# Patient Record
Sex: MALE | Race: WHITE | Employment: OTHER | URBAN - METROPOLITAN AREA
[De-identification: names, ages, dates, MRNs, and addresses within clinical notes are randomized per-mention and may not be internally consistent; named-entity substitution may affect disease eponyms.]

---

## 2017-09-06 ENCOUNTER — APPOINTMENT (OUTPATIENT)
Dept: GENERAL RADIOLOGY | Age: 62
DRG: 246 | End: 2017-09-06
Attending: INTERNAL MEDICINE
Payer: COMMERCIAL

## 2017-09-06 ENCOUNTER — HOSPITAL ENCOUNTER (INPATIENT)
Age: 62
LOS: 3 days | Discharge: HOME | DRG: 246 | End: 2017-09-09
Attending: EMERGENCY MEDICINE | Admitting: INTERNAL MEDICINE
Payer: COMMERCIAL

## 2017-09-06 DIAGNOSIS — I21.19 ACUTE MYOCARDIAL INFARCTION OF INFERIOR WALL, INITIAL EPISODE OF CARE (HCC): Primary | ICD-10-CM

## 2017-09-06 PROBLEM — I21.11 ST ELEVATION (STEMI) MYOCARDIAL INFARCTION INVOLVING RIGHT CORONARY ARTERY (HCC): Status: ACTIVE | Noted: 2017-09-06

## 2017-09-06 LAB
ACT BLD: 212 SECS (ref 79–138)
ALBUMIN SERPL-MCNC: 3.4 G/DL (ref 3.4–5)
ALBUMIN/GLOB SERPL: 1 {RATIO} (ref 0.8–1.7)
ALP SERPL-CCNC: 73 U/L (ref 45–117)
ALT SERPL-CCNC: 19 U/L (ref 16–61)
ANION GAP BLD CALC-SCNC: 18 MMOL/L (ref 10–20)
ANION GAP SERPL CALC-SCNC: 10 MMOL/L (ref 3–18)
ANION GAP SERPL CALC-SCNC: 9 MMOL/L (ref 3–18)
APTT PPP: 29.5 SEC (ref 23–36.4)
AST SERPL-CCNC: 31 U/L (ref 15–37)
ATRIAL RATE: 58 BPM
ATRIAL RATE: 62 BPM
BASOPHILS # BLD: 0.1 K/UL (ref 0–0.06)
BASOPHILS # BLD: 0.1 K/UL (ref 0–0.06)
BASOPHILS NFR BLD: 1 % (ref 0–2)
BASOPHILS NFR BLD: 1 % (ref 0–2)
BILIRUB SERPL-MCNC: 0.5 MG/DL (ref 0.2–1)
BNP SERPL-MCNC: 251 PG/ML (ref 0–900)
BUN BLD-MCNC: 9 MG/DL (ref 7–18)
BUN SERPL-MCNC: 10 MG/DL (ref 7–18)
BUN SERPL-MCNC: 11 MG/DL (ref 7–18)
BUN/CREAT SERPL: 12 (ref 12–20)
BUN/CREAT SERPL: 9 (ref 12–20)
CA-I BLD-MCNC: 1.14 MMOL/L (ref 1.12–1.32)
CALCIUM SERPL-MCNC: 8.7 MG/DL (ref 8.5–10.1)
CALCIUM SERPL-MCNC: 8.7 MG/DL (ref 8.5–10.1)
CALCULATED P AXIS, ECG09: 67 DEGREES
CALCULATED P AXIS, ECG09: 68 DEGREES
CALCULATED R AXIS, ECG10: 60 DEGREES
CALCULATED R AXIS, ECG10: 7 DEGREES
CALCULATED T AXIS, ECG11: 108 DEGREES
CALCULATED T AXIS, ECG11: 95 DEGREES
CHLORIDE BLD-SCNC: 101 MMOL/L (ref 100–108)
CHLORIDE SERPL-SCNC: 103 MMOL/L (ref 100–108)
CHLORIDE SERPL-SCNC: 103 MMOL/L (ref 100–108)
CK MB CFR SERPL CALC: 1.6 % (ref 0–4)
CK MB CFR SERPL CALC: 6.9 % (ref 0–4)
CK MB CFR SERPL CALC: 8.2 % (ref 0–4)
CK MB SERPL-MCNC: 13.8 NG/ML (ref 5–25)
CK MB SERPL-MCNC: 14.6 NG/ML (ref 5–25)
CK MB SERPL-MCNC: 2.2 NG/ML (ref 5–25)
CK SERPL-CCNC: 135 U/L (ref 39–308)
CK SERPL-CCNC: 179 U/L (ref 39–308)
CK SERPL-CCNC: 201 U/L (ref 39–308)
CO2 BLD-SCNC: 25 MMOL/L (ref 19–24)
CO2 SERPL-SCNC: 25 MMOL/L (ref 21–32)
CO2 SERPL-SCNC: 27 MMOL/L (ref 21–32)
CREAT SERPL-MCNC: 0.95 MG/DL (ref 0.6–1.3)
CREAT SERPL-MCNC: 1.08 MG/DL (ref 0.6–1.3)
CREAT UR-MCNC: 1 MG/DL (ref 0.6–1.3)
DIAGNOSIS, 93000: NORMAL
DIAGNOSIS, 93000: NORMAL
DIFFERENTIAL METHOD BLD: ABNORMAL
DIFFERENTIAL METHOD BLD: ABNORMAL
EOSINOPHIL # BLD: 0.4 K/UL (ref 0–0.4)
EOSINOPHIL # BLD: 0.6 K/UL (ref 0–0.4)
EOSINOPHIL NFR BLD: 5 % (ref 0–5)
EOSINOPHIL NFR BLD: 8 % (ref 0–5)
ERYTHROCYTE [DISTWIDTH] IN BLOOD BY AUTOMATED COUNT: 14.1 % (ref 11.6–14.5)
ERYTHROCYTE [DISTWIDTH] IN BLOOD BY AUTOMATED COUNT: 14.1 % (ref 11.6–14.5)
GLOBULIN SER CALC-MCNC: 3.3 G/DL (ref 2–4)
GLUCOSE BLD STRIP.AUTO-MCNC: 109 MG/DL (ref 74–106)
GLUCOSE SERPL-MCNC: 105 MG/DL (ref 74–99)
GLUCOSE SERPL-MCNC: 113 MG/DL (ref 74–99)
HCT VFR BLD AUTO: 41.9 % (ref 36–48)
HCT VFR BLD AUTO: 43.3 % (ref 36–48)
HCT VFR BLD CALC: 46 % (ref 36–49)
HGB BLD-MCNC: 14.2 G/DL (ref 13–16)
HGB BLD-MCNC: 15 G/DL (ref 13–16)
HGB BLD-MCNC: 15.6 G/DL (ref 12–16)
LYMPHOCYTES # BLD: 1.4 K/UL (ref 0.9–3.6)
LYMPHOCYTES # BLD: 2.3 K/UL (ref 0.9–3.6)
LYMPHOCYTES NFR BLD: 18 % (ref 21–52)
LYMPHOCYTES NFR BLD: 31 % (ref 21–52)
MAGNESIUM SERPL-MCNC: 1.7 MG/DL (ref 1.6–2.6)
MCH RBC QN AUTO: 33.6 PG (ref 24–34)
MCH RBC QN AUTO: 33.9 PG (ref 24–34)
MCHC RBC AUTO-ENTMCNC: 33.9 G/DL (ref 31–37)
MCHC RBC AUTO-ENTMCNC: 34.6 G/DL (ref 31–37)
MCV RBC AUTO: 98 FL (ref 74–97)
MCV RBC AUTO: 99.3 FL (ref 74–97)
MONOCYTES # BLD: 0.6 K/UL (ref 0.05–1.2)
MONOCYTES # BLD: 0.7 K/UL (ref 0.05–1.2)
MONOCYTES NFR BLD: 10 % (ref 3–10)
MONOCYTES NFR BLD: 8 % (ref 3–10)
NEUTS SEG # BLD: 3.7 K/UL (ref 1.8–8)
NEUTS SEG # BLD: 5.1 K/UL (ref 1.8–8)
NEUTS SEG NFR BLD: 50 % (ref 40–73)
NEUTS SEG NFR BLD: 68 % (ref 40–73)
P-R INTERVAL, ECG05: 190 MS
P-R INTERVAL, ECG05: 198 MS
PLATELET # BLD AUTO: 188 K/UL (ref 135–420)
PLATELET # BLD AUTO: 223 K/UL (ref 135–420)
PMV BLD AUTO: 10 FL (ref 9.2–11.8)
PMV BLD AUTO: 9.8 FL (ref 9.2–11.8)
POTASSIUM BLD-SCNC: 4.1 MMOL/L (ref 3.5–5.5)
POTASSIUM SERPL-SCNC: 3.7 MMOL/L (ref 3.5–5.5)
POTASSIUM SERPL-SCNC: 4.2 MMOL/L (ref 3.5–5.5)
PROT SERPL-MCNC: 6.7 G/DL (ref 6.4–8.2)
Q-T INTERVAL, ECG07: 446 MS
Q-T INTERVAL, ECG07: 470 MS
QRS DURATION, ECG06: 104 MS
QRS DURATION, ECG06: 142 MS
QTC CALCULATION (BEZET), ECG08: 437 MS
QTC CALCULATION (BEZET), ECG08: 477 MS
RBC # BLD AUTO: 4.22 M/UL (ref 4.7–5.5)
RBC # BLD AUTO: 4.42 M/UL (ref 4.7–5.5)
SODIUM BLD-SCNC: 139 MMOL/L (ref 136–145)
SODIUM SERPL-SCNC: 137 MMOL/L (ref 136–145)
SODIUM SERPL-SCNC: 140 MMOL/L (ref 136–145)
TROPONIN I BLD-MCNC: 0.1 NG/ML (ref 0–0.08)
TROPONIN I SERPL-MCNC: 0.29 NG/ML (ref 0–0.06)
TROPONIN I SERPL-MCNC: 3.87 NG/ML (ref 0–0.06)
TROPONIN I SERPL-MCNC: 4.49 NG/ML (ref 0–0.06)
VENTRICULAR RATE, ECG03: 58 BPM
VENTRICULAR RATE, ECG03: 62 BPM
WBC # BLD AUTO: 7.3 K/UL (ref 4.6–13.2)
WBC # BLD AUTO: 7.5 K/UL (ref 4.6–13.2)

## 2017-09-06 PROCEDURE — 77030013797 CARDIAC CATHETERIZATION

## 2017-09-06 PROCEDURE — 74011000250 HC RX REV CODE- 250: Performed by: INTERNAL MEDICINE

## 2017-09-06 PROCEDURE — 80053 COMPREHEN METABOLIC PANEL: CPT | Performed by: INTERNAL MEDICINE

## 2017-09-06 PROCEDURE — 74011250636 HC RX REV CODE- 250/636: Performed by: EMERGENCY MEDICINE

## 2017-09-06 PROCEDURE — 74011250636 HC RX REV CODE- 250/636: Performed by: INTERNAL MEDICINE

## 2017-09-06 PROCEDURE — B2111ZZ FLUOROSCOPY OF MULTIPLE CORONARY ARTERIES USING LOW OSMOLAR CONTRAST: ICD-10-PCS | Performed by: INTERNAL MEDICINE

## 2017-09-06 PROCEDURE — B2151ZZ FLUOROSCOPY OF LEFT HEART USING LOW OSMOLAR CONTRAST: ICD-10-PCS | Performed by: INTERNAL MEDICINE

## 2017-09-06 PROCEDURE — 83880 ASSAY OF NATRIURETIC PEPTIDE: CPT | Performed by: EMERGENCY MEDICINE

## 2017-09-06 PROCEDURE — 80048 BASIC METABOLIC PNL TOTAL CA: CPT | Performed by: EMERGENCY MEDICINE

## 2017-09-06 PROCEDURE — 74011250636 HC RX REV CODE- 250/636

## 2017-09-06 PROCEDURE — 71010 XR CHEST PORT: CPT

## 2017-09-06 PROCEDURE — 82550 ASSAY OF CK (CPK): CPT | Performed by: EMERGENCY MEDICINE

## 2017-09-06 PROCEDURE — 65610000006 HC RM INTENSIVE CARE

## 2017-09-06 PROCEDURE — 3E083PZ INTRODUCTION OF PLATELET INHIBITOR INTO HEART, PERCUTANEOUS APPROACH: ICD-10-PCS | Performed by: INTERNAL MEDICINE

## 2017-09-06 PROCEDURE — 93306 TTE W/DOPPLER COMPLETE: CPT

## 2017-09-06 PROCEDURE — 93005 ELECTROCARDIOGRAM TRACING: CPT

## 2017-09-06 PROCEDURE — 83735 ASSAY OF MAGNESIUM: CPT | Performed by: EMERGENCY MEDICINE

## 2017-09-06 PROCEDURE — 027034Z DILATION OF CORONARY ARTERY, ONE ARTERY WITH DRUG-ELUTING INTRALUMINAL DEVICE, PERCUTANEOUS APPROACH: ICD-10-PCS | Performed by: INTERNAL MEDICINE

## 2017-09-06 PROCEDURE — 74011000250 HC RX REV CODE- 250

## 2017-09-06 PROCEDURE — 74011250637 HC RX REV CODE- 250/637: Performed by: INTERNAL MEDICINE

## 2017-09-06 PROCEDURE — 77010033678 HC OXYGEN DAILY

## 2017-09-06 PROCEDURE — 96374 THER/PROPH/DIAG INJ IV PUSH: CPT

## 2017-09-06 PROCEDURE — 36415 COLL VENOUS BLD VENIPUNCTURE: CPT | Performed by: INTERNAL MEDICINE

## 2017-09-06 PROCEDURE — 85347 COAGULATION TIME ACTIVATED: CPT

## 2017-09-06 PROCEDURE — 80047 BASIC METABLC PNL IONIZED CA: CPT

## 2017-09-06 PROCEDURE — 83036 HEMOGLOBIN GLYCOSYLATED A1C: CPT | Performed by: HOSPITALIST

## 2017-09-06 PROCEDURE — 74011636320 HC RX REV CODE- 636/320: Performed by: INTERNAL MEDICINE

## 2017-09-06 PROCEDURE — 85025 COMPLETE CBC W/AUTO DIFF WBC: CPT | Performed by: EMERGENCY MEDICINE

## 2017-09-06 PROCEDURE — 4A023N7 MEASUREMENT OF CARDIAC SAMPLING AND PRESSURE, LEFT HEART, PERCUTANEOUS APPROACH: ICD-10-PCS | Performed by: INTERNAL MEDICINE

## 2017-09-06 PROCEDURE — 84484 ASSAY OF TROPONIN QUANT: CPT | Performed by: EMERGENCY MEDICINE

## 2017-09-06 PROCEDURE — 74011250637 HC RX REV CODE- 250/637: Performed by: EMERGENCY MEDICINE

## 2017-09-06 PROCEDURE — 99285 EMERGENCY DEPT VISIT HI MDM: CPT

## 2017-09-06 PROCEDURE — 85730 THROMBOPLASTIN TIME PARTIAL: CPT | Performed by: EMERGENCY MEDICINE

## 2017-09-06 RX ORDER — PANTOPRAZOLE SODIUM 20 MG/1
20 TABLET, DELAYED RELEASE ORAL 2 TIMES DAILY
COMMUNITY

## 2017-09-06 RX ORDER — ASPIRIN 81 MG/1
81 TABLET ORAL DAILY
Status: DISCONTINUED | OUTPATIENT
Start: 2017-09-06 | End: 2017-09-09 | Stop reason: HOSPADM

## 2017-09-06 RX ORDER — NITROGLYCERIN 0.4 MG/1
0.4 TABLET SUBLINGUAL AS NEEDED
Status: DISCONTINUED | OUTPATIENT
Start: 2017-09-06 | End: 2017-09-09 | Stop reason: HOSPADM

## 2017-09-06 RX ORDER — EPTIFIBATIDE 2 MG/ML
180 INJECTION, SOLUTION INTRAVENOUS
Status: DISCONTINUED | OUTPATIENT
Start: 2017-09-06 | End: 2017-09-06 | Stop reason: HOSPADM

## 2017-09-06 RX ORDER — MIDAZOLAM HYDROCHLORIDE 1 MG/ML
.5-2 INJECTION, SOLUTION INTRAMUSCULAR; INTRAVENOUS
Status: DISCONTINUED | OUTPATIENT
Start: 2017-09-06 | End: 2017-09-06 | Stop reason: HOSPADM

## 2017-09-06 RX ORDER — HEPARIN SODIUM 1000 [USP'U]/ML
50.1 INJECTION, SOLUTION INTRAVENOUS; SUBCUTANEOUS ONCE
Status: COMPLETED | OUTPATIENT
Start: 2017-09-06 | End: 2017-09-06

## 2017-09-06 RX ORDER — SODIUM CHLORIDE 9 MG/ML
50 INJECTION, SOLUTION INTRAVENOUS CONTINUOUS
Status: DISPENSED | OUTPATIENT
Start: 2017-09-06 | End: 2017-09-06

## 2017-09-06 RX ORDER — ENOXAPARIN SODIUM 100 MG/ML
40 INJECTION SUBCUTANEOUS EVERY 24 HOURS
Status: DISCONTINUED | OUTPATIENT
Start: 2017-09-07 | End: 2017-09-09 | Stop reason: HOSPADM

## 2017-09-06 RX ORDER — EZETIMIBE 10 MG/1
10 TABLET ORAL DAILY
COMMUNITY

## 2017-09-06 RX ORDER — HEPARIN SODIUM 1000 [USP'U]/ML
INJECTION, SOLUTION INTRAVENOUS; SUBCUTANEOUS
Status: COMPLETED
Start: 2017-09-06 | End: 2017-09-06

## 2017-09-06 RX ORDER — SODIUM CHLORIDE 9 MG/ML
1 INJECTION INTRAMUSCULAR; INTRAVENOUS; SUBCUTANEOUS ONCE
Status: DISCONTINUED | OUTPATIENT
Start: 2017-09-06 | End: 2017-09-06 | Stop reason: CLARIF

## 2017-09-06 RX ORDER — EPTIFIBATIDE 0.75 MG/ML
INJECTION, SOLUTION INTRAVENOUS
Status: COMPLETED
Start: 2017-09-06 | End: 2017-09-06

## 2017-09-06 RX ORDER — HEPARIN SODIUM 1000 [USP'U]/ML
1000-4000 INJECTION, SOLUTION INTRAVENOUS; SUBCUTANEOUS
Status: DISCONTINUED | OUTPATIENT
Start: 2017-09-06 | End: 2017-09-06 | Stop reason: HOSPADM

## 2017-09-06 RX ORDER — SODIUM CHLORIDE 0.9 % (FLUSH) 0.9 %
5-10 SYRINGE (ML) INJECTION AS NEEDED
Status: DISCONTINUED | OUTPATIENT
Start: 2017-09-06 | End: 2017-09-09 | Stop reason: HOSPADM

## 2017-09-06 RX ORDER — LIDOCAINE HYDROCHLORIDE 10 MG/ML
INJECTION INFILTRATION; PERINEURAL
Status: COMPLETED
Start: 2017-09-06 | End: 2017-09-06

## 2017-09-06 RX ORDER — SODIUM CHLORIDE 0.9 % (FLUSH) 0.9 %
5-10 SYRINGE (ML) INJECTION EVERY 8 HOURS
Status: DISCONTINUED | OUTPATIENT
Start: 2017-09-06 | End: 2017-09-09 | Stop reason: HOSPADM

## 2017-09-06 RX ORDER — LIDOCAINE HYDROCHLORIDE 10 MG/ML
3-30 INJECTION INFILTRATION; PERINEURAL
Status: DISCONTINUED | OUTPATIENT
Start: 2017-09-06 | End: 2017-09-06 | Stop reason: HOSPADM

## 2017-09-06 RX ORDER — HEPARIN SODIUM 200 [USP'U]/100ML
500 INJECTION, SOLUTION INTRAVENOUS
Status: DISCONTINUED | OUTPATIENT
Start: 2017-09-06 | End: 2017-09-06 | Stop reason: HOSPADM

## 2017-09-06 RX ORDER — EPTIFIBATIDE 2 MG/ML
INJECTION, SOLUTION INTRAVENOUS
Status: COMPLETED
Start: 2017-09-06 | End: 2017-09-06

## 2017-09-06 RX ORDER — EPTIFIBATIDE 0.75 MG/ML
2 INJECTION, SOLUTION INTRAVENOUS CONTINUOUS
Status: DISPENSED | OUTPATIENT
Start: 2017-09-06 | End: 2017-09-06

## 2017-09-06 RX ORDER — METOPROLOL TARTRATE 25 MG/1
12.5 TABLET, FILM COATED ORAL EVERY 12 HOURS
Status: DISCONTINUED | OUTPATIENT
Start: 2017-09-06 | End: 2017-09-09 | Stop reason: HOSPADM

## 2017-09-06 RX ORDER — MIDAZOLAM HYDROCHLORIDE 1 MG/ML
INJECTION, SOLUTION INTRAMUSCULAR; INTRAVENOUS
Status: COMPLETED
Start: 2017-09-06 | End: 2017-09-06

## 2017-09-06 RX ORDER — HEPARIN SODIUM 200 [USP'U]/100ML
INJECTION, SOLUTION INTRAVENOUS
Status: COMPLETED
Start: 2017-09-06 | End: 2017-09-06

## 2017-09-06 RX ORDER — FENTANYL CITRATE 50 UG/ML
INJECTION, SOLUTION INTRAMUSCULAR; INTRAVENOUS
Status: COMPLETED
Start: 2017-09-06 | End: 2017-09-06

## 2017-09-06 RX ORDER — NITROGLYCERIN 0.4 MG/1
0.4 TABLET SUBLINGUAL
COMMUNITY

## 2017-09-06 RX ORDER — SODIUM CHLORIDE 9 MG/ML
25 INJECTION, SOLUTION INTRAVENOUS CONTINUOUS
Status: DISCONTINUED | OUTPATIENT
Start: 2017-09-06 | End: 2017-09-06

## 2017-09-06 RX ORDER — FENTANYL CITRATE 50 UG/ML
25-100 INJECTION, SOLUTION INTRAMUSCULAR; INTRAVENOUS
Status: DISCONTINUED | OUTPATIENT
Start: 2017-09-06 | End: 2017-09-06 | Stop reason: HOSPADM

## 2017-09-06 RX ORDER — VERAPAMIL HYDROCHLORIDE 2.5 MG/ML
INJECTION, SOLUTION INTRAVENOUS
Status: DISPENSED
Start: 2017-09-06 | End: 2017-09-06

## 2017-09-06 RX ORDER — HEPARIN SODIUM 10000 [USP'U]/100ML
12-25 INJECTION, SOLUTION INTRAVENOUS
Status: DISCONTINUED | OUTPATIENT
Start: 2017-09-06 | End: 2017-09-06

## 2017-09-06 RX ADMIN — EPTIFIBATIDE 2 MCG/KG/MIN: 0.75 INJECTION, SOLUTION INTRAVENOUS at 08:17

## 2017-09-06 RX ADMIN — TICAGRELOR 90 MG: 90 TABLET ORAL at 22:00

## 2017-09-06 RX ADMIN — HEPARIN SODIUM 2000 UNITS: 1000 INJECTION, SOLUTION INTRAVENOUS; SUBCUTANEOUS at 01:25

## 2017-09-06 RX ADMIN — LIDOCAINE HYDROCHLORIDE 3 ML: 10 INJECTION, SOLUTION INFILTRATION; PERINEURAL at 01:14

## 2017-09-06 RX ADMIN — FENTANYL CITRATE 25 MCG: 50 INJECTION, SOLUTION INTRAMUSCULAR; INTRAVENOUS at 01:50

## 2017-09-06 RX ADMIN — HEPARIN SODIUM AND DEXTROSE 12 UNITS/KG/HR: 10000; 5 INJECTION INTRAVENOUS at 00:56

## 2017-09-06 RX ADMIN — SODIUM CHLORIDE 25 ML/HR: 900 INJECTION, SOLUTION INTRAVENOUS at 02:02

## 2017-09-06 RX ADMIN — EPTIFIBATIDE 14.36 MG: 2 INJECTION, SOLUTION INTRAVENOUS at 01:42

## 2017-09-06 RX ADMIN — TICAGRELOR 90 MG: 90 TABLET ORAL at 09:24

## 2017-09-06 RX ADMIN — NITROGLYCERIN 0.4 MG: 0.4 TABLET SUBLINGUAL at 00:52

## 2017-09-06 RX ADMIN — MIDAZOLAM HYDROCHLORIDE 1 MG: 1 INJECTION, SOLUTION INTRAMUSCULAR; INTRAVENOUS at 01:20

## 2017-09-06 RX ADMIN — HEPARIN SODIUM 4000 UNITS: 1000 INJECTION, SOLUTION INTRAVENOUS; SUBCUTANEOUS at 00:52

## 2017-09-06 RX ADMIN — VERAPAMIL HYDROCHLORIDE 3 ML: 2.5 INJECTION INTRAVENOUS at 01:17

## 2017-09-06 RX ADMIN — SODIUM CHLORIDE 50 ML/HR: 900 INJECTION, SOLUTION INTRAVENOUS at 03:13

## 2017-09-06 RX ADMIN — ASPIRIN 81 MG: 81 TABLET, COATED ORAL at 08:11

## 2017-09-06 RX ADMIN — HEPARIN SODIUM 4000 UNITS: 1000 INJECTION, SOLUTION INTRAVENOUS; SUBCUTANEOUS at 01:17

## 2017-09-06 RX ADMIN — METOPROLOL TARTRATE 12.5 MG: 25 TABLET ORAL at 09:23

## 2017-09-06 RX ADMIN — LIDOCAINE HYDROCHLORIDE 3 ML: 10 INJECTION INFILTRATION; PERINEURAL at 01:14

## 2017-09-06 RX ADMIN — SODIUM CHLORIDE 500 ML: 900 INJECTION, SOLUTION INTRAVENOUS at 00:53

## 2017-09-06 RX ADMIN — MIDAZOLAM HYDROCHLORIDE 0.5 MG: 1 INJECTION, SOLUTION INTRAMUSCULAR; INTRAVENOUS at 01:50

## 2017-09-06 RX ADMIN — MIDAZOLAM HYDROCHLORIDE 0.5 MG: 1 INJECTION, SOLUTION INTRAMUSCULAR; INTRAVENOUS at 02:00

## 2017-09-06 RX ADMIN — EPTIFIBATIDE 2 MCG/KG/MIN: 0.75 INJECTION, SOLUTION INTRAVENOUS at 01:45

## 2017-09-06 RX ADMIN — FENTANYL CITRATE 50 MCG: 50 INJECTION, SOLUTION INTRAMUSCULAR; INTRAVENOUS at 01:20

## 2017-09-06 RX ADMIN — HEPARIN SODIUM 2000 UNITS: 200 INJECTION, SOLUTION INTRAVENOUS at 01:31

## 2017-09-06 RX ADMIN — FENTANYL CITRATE 25 MCG: 50 INJECTION, SOLUTION INTRAMUSCULAR; INTRAVENOUS at 02:00

## 2017-09-06 RX ADMIN — TICAGRELOR 180 MG: 90 TABLET ORAL at 02:07

## 2017-09-06 RX ADMIN — METOPROLOL TARTRATE 12.5 MG: 25 TABLET ORAL at 22:00

## 2017-09-06 RX ADMIN — Medication 10 ML: at 13:08

## 2017-09-06 RX ADMIN — NITROGLYCERIN 1 INCH: 20 OINTMENT TOPICAL at 00:57

## 2017-09-06 RX ADMIN — EPTIFIBATIDE 14.36 MG: 2 INJECTION, SOLUTION INTRAVENOUS at 01:53

## 2017-09-06 RX ADMIN — IOPAMIDOL 190 ML: 510 INJECTION, SOLUTION INTRAVASCULAR at 02:03

## 2017-09-06 NOTE — PROCEDURES
LHC, LV gram and coronary angiogram done via left radial artery. Distal % instent thrombosis. LM luminal irregularities. LAD has mild to moderate disease. LCx has mild disease. Successful PTCA/stent of distal RCA done with good result. LV gram sub optimal, LVEF appears preserved. Aspirin, Brilinta, Beat blocker, Statin and Integrilin drip  Echocardiogram    Plan discussed with patient and family.

## 2017-09-06 NOTE — PROGRESS NOTES
Pt SR BBB on monitor, no complaints of chest pain this shift. Integrilin off at 1300, Sheath site CDI, no hematoma/oozing. Compression dressing removed at 1600. Troponin trending down. On RA tolerating well. A&Ox4, DICKENS. Cardiac diet, eating breakfast and lunch, OOB to bedside commode. Adequate UOP. Family by bedside, updated on POC.

## 2017-09-06 NOTE — ED TRIAGE NOTES
Patient arrives via EMS for chest pain that started at 1800, patient took 2 of his own Nitroglycerin sublingual tablets and went to sleep. Patient took 1 more additional nitroglycerin prior to EMS arrival. Patient given 324 aspirin, 4 zofran, 4 morphine. Patient states no relief from pain. Sepsis Screening completed    (  )Patient meets SIRS criteria. ( x )Patient does not meet SIRS criteria.       SIRS Criteria is achieved when two or more of the following are present   Temperature < 96.8°F (36°C) or > 100.9°F (38.3°C)   Heart Rate > 90 beats per minute   Respiratory Rate > 20 breaths per minute   WBC count > 12,000 or <4,000 or > 10% bands

## 2017-09-06 NOTE — ED PROVIDER NOTES
HPI Comments: 12:39 AM    Amada Foley is a 60 y.o. male PMHx MI with of cardiac stents in January 2015 presents to ED via EMS C/O CP, described as a tightness, x ~1800 yesterday. Pt took two nitro at home and reported that the pain did not go away, but lessened it. Woke up and took a third nitro which did not help at all. EMS gave the pt baby Asprin, 4 Zofran, and 4 mg morphine. Per EMS, pt's blood pressure was 161/93 en route to the ED. Their EKG read STEMI. Pt endorses smoking despite his previous MI, no alcohol or drugs. Family history is negative . Reports shellfish allergy. Pt denies any other sxs or complaints. The history is provided by the patient and the EMS personnel. No  was used. Past Medical History:   Diagnosis Date    MI (myocardial infarction) ([facility] [address]        Past Surgical History:   Procedure Laterality Date    HX CORONARY STENT PLACEMENT  2015         History reviewed. No pertinent family history. Social History     Social History    Marital status:      Spouse name: N/A    Number of children: N/A    Years of education: N/A     Occupational History    Not on file. Social History Main Topics    Smoking status: Current Every Day Smoker     Packs/day: 1.50    Smokeless tobacco: Not on file    Alcohol use 12.6 oz/week     21 Cans of beer per week    Drug use: No    Sexual activity: Not on file     Other Topics Concern    Not on file     Social History Narrative    No narrative on file         ALLERGIES: Other plant, animal, environmental and Shellfish derived    Review of Systems   Constitutional: Negative for chills and fever. Cardiovascular: Positive for chest pain. Neurological: Negative for headaches. All other systems reviewed and are negative.       Vitals:    09/06/17 0052 09/06/17 0055 09/06/17 0057 09/06/17 0100   BP: 129/79 123/69 123/69 121/74   Pulse: 67 60 63 75   Resp:  17  16   Temp:       SpO2:  98%  95%   Weight: Height:                Physical Exam   Constitutional: He is oriented to person, place, and time. He appears well-developed and well-nourished. Uncomfortable appearing, nontoxic   HENT:   Head: Normocephalic and atraumatic. Right Ear: External ear normal.   Left Ear: External ear normal.   Mouth/Throat: Oropharynx is clear and moist. No oropharyngeal exudate. Eyes: Conjunctivae and EOM are normal. Pupils are equal, round, and reactive to light. No scleral icterus. No pallor   Neck: Normal range of motion. Neck supple. No JVD present. No tracheal deviation present. No thyromegaly present. Cardiovascular: Normal rate, regular rhythm and normal heart sounds. Pulmonary/Chest: Effort normal and breath sounds normal. No stridor. No respiratory distress. Abdominal: Soft. Bowel sounds are normal. He exhibits no distension. There is no tenderness. There is no rebound and no guarding. Musculoskeletal: Normal range of motion. He exhibits no edema or tenderness. No soft tissue injuries   Lymphadenopathy:     He has no cervical adenopathy. Neurological: He is alert and oriented to person, place, and time. He has normal reflexes. No cranial nerve deficit. Coordination normal.   Skin: Skin is warm and dry. No rash noted. He is not diaphoretic. No erythema. Various scattered tattoos    Psychiatric: He has a normal mood and affect. His behavior is normal. Judgment and thought content normal.   Nursing note and vitals reviewed. RESULTS:    PULSE OXIMETRY NOTE:  Pulse-ox is 95% on room air  Interpretation: normal     EKG interpretation: EMS  12:51 AM   Code STEMI, called at 12:30 AM. EKG meets ST evelation MI criteria, Inferior ST elevation considered acute infarct. EKG read by Brooklyn Bruner. Pranay Pereira MD at 12:30 AM    EKG interpretation: (Preliminary)  12:52 AM   Acute left wall MI with reciprocal changes, Sinus bradycardia at 58 bpm  EKG read by Brooklyn Bruner.  Pranay Pereira MD at 12:38 AM      Labs Reviewed   CBC WITH AUTOMATED DIFF - Abnormal; Notable for the following:        Result Value    RBC 4.42 (*)     MCV 98.0 (*)     EOSINOPHILS 8 (*)     ABS. EOSINOPHILS 0.6 (*)     ABS. BASOPHILS 0.1 (*)     All other components within normal limits   CARDIAC PANEL,(CK, CKMB & TROPONIN) - Abnormal; Notable for the following:     Troponin-I, Qt. 0.29 (*)     All other components within normal limits   METABOLIC PANEL, BASIC - Abnormal; Notable for the following:     Glucose 113 (*)     BUN/Creatinine ratio 9 (*)     All other components within normal limits   POC CHEM8 - Abnormal; Notable for the following:     CO2, POC 25 (*)     Glucose,  (*)     All other components within normal limits   POC TROPONIN-I - Abnormal; Notable for the following:     Troponin-I (POC) 0.10 (*)     All other components within normal limits   PTT   CBC WITH AUTOMATED DIFF   MAGNESIUM   NT-PRO BNP   PTT   CBC WITH AUTOMATED DIFF   POC TROPONIN-I   POC CHEM8       Recent Results (from the past 12 hour(s))   CBC WITH AUTOMATED DIFF    Collection Time: 09/06/17 12:39 AM   Result Value Ref Range    WBC 7.3 4.6 - 13.2 K/uL    RBC 4.42 (L) 4.70 - 5.50 M/uL    HGB 15.0 13.0 - 16.0 g/dL    HCT 43.3 36.0 - 48.0 %    MCV 98.0 (H) 74.0 - 97.0 FL    MCH 33.9 24.0 - 34.0 PG    MCHC 34.6 31.0 - 37.0 g/dL    RDW 14.1 11.6 - 14.5 %    PLATELET 170 419 - 378 K/uL    MPV 9.8 9.2 - 11.8 FL    NEUTROPHILS 50 40 - 73 %    LYMPHOCYTES 31 21 - 52 %    MONOCYTES 10 3 - 10 %    EOSINOPHILS 8 (H) 0 - 5 %    BASOPHILS 1 0 - 2 %    ABS. NEUTROPHILS 3.7 1.8 - 8.0 K/UL    ABS. LYMPHOCYTES 2.3 0.9 - 3.6 K/UL    ABS. MONOCYTES 0.7 0.05 - 1.2 K/UL    ABS. EOSINOPHILS 0.6 (H) 0.0 - 0.4 K/UL    ABS.  BASOPHILS 0.1 (H) 0.0 - 0.06 K/UL    DF AUTOMATED     PTT    Collection Time: 09/06/17 12:39 AM   Result Value Ref Range    aPTT 29.5 23.0 - 36.4 SEC   CARDIAC PANEL,(CK, CKMB & TROPONIN)    Collection Time: 09/06/17 12:39 AM   Result Value Ref Range     39 - 308 U/L    CK - MB 2. 2 <3.6 ng/ml    CK-MB Index 1.6 0.0 - 4.0 %    Troponin-I, Qt. 0.29 (H) 0.00 - 2.86 NG/ML   METABOLIC PANEL, BASIC    Collection Time: 09/06/17 12:39 AM   Result Value Ref Range    Sodium 140 136 - 145 mmol/L    Potassium 4.2 3.5 - 5.5 mmol/L    Chloride 103 100 - 108 mmol/L    CO2 27 21 - 32 mmol/L    Anion gap 10 3.0 - 18 mmol/L    Glucose 113 (H) 74 - 99 mg/dL    BUN 10 7.0 - 18 MG/DL    Creatinine 1.08 0.6 - 1.3 MG/DL    BUN/Creatinine ratio 9 (L) 12 - 20      GFR est AA >60 >60 ml/min/1.73m2    GFR est non-AA >60 >60 ml/min/1.73m2    Calcium 8.7 8.5 - 10.1 MG/DL   MAGNESIUM    Collection Time: 09/06/17 12:39 AM   Result Value Ref Range    Magnesium 1.7 1.6 - 2.6 mg/dL   NT-PRO BNP    Collection Time: 09/06/17 12:39 AM   Result Value Ref Range    NT pro- 0 - 900 PG/ML   POC CHEM8    Collection Time: 09/06/17 12:49 AM   Result Value Ref Range    CO2, POC 25 (H) 19 - 24 MMOL/L    Glucose,  (H) 74 - 106 MG/DL    BUN, POC 9 7 - 18 MG/DL    Creatinine, POC 1.0 0.6 - 1.3 MG/DL    GFRAA, POC >60 >60 ml/min/1.73m2    GFRNA, POC >60 >60 ml/min/1.73m2    Sodium,  136 - 145 MMOL/L    Potassium, POC 4.1 3.5 - 5.5 MMOL/L    Calcium, ionized (POC) 1.14 1.12 - 1.32 MMOL/L    Chloride,  100 - 108 MMOL/L    Anion gap, POC 18 10 - 20      Hematocrit, POC 46 36 - 49 %    Hemoglobin, POC 15.6 12 - 16 G/DL   POC TROPONIN-I    Collection Time: 09/06/17 12:51 AM   Result Value Ref Range    Troponin-I (POC) 0.10 (H) 0.00 - 0.08 ng/mL       MDM  Number of Diagnoses or Management Options  Acute myocardial infarction of inferior wall, initial episode of care [facility]):   Diagnosis management comments: DDx: acute MI, ventricular aneurysm. PE is also possible.        Amount and/or Complexity of Data Reviewed  Clinical lab tests: ordered and reviewed  Tests in the medicine section of CPT®: ordered and reviewed (EKG)  Obtain history from someone other than the patient: yes (EMS)  Discuss the patient with other providers: yes (Cardiology - Kentrell Hatfield MD  )  Independent visualization of images, tracings, or specimens: yes (EKG)      ED Course     Medications   sodium chloride 0.9 % bolus infusion 500 mL (500 mL IntraVENous New Bag 9/6/17 0053)   nitroglycerin (NITROSTAT) tablet 0.4 mg (0.4 mg SubLINGual Given 9/6/17 0052)   heparin 25,000 units in D5W 250 ml infusion (0 Units/kg/hr × 79.8 kg IntraVENous Stopped 9/6/17 0119)   0.9% sodium chloride infusion (not administered)   eptifibatide (INTEGRILIN) injection 14.36 mg (not administered)   eptifibatide (INTEGRILIN) 0.75 mg/mL infusion (not administered)   fentaNYL citrate (PF) injection  mcg (50 mcg IntraVENous Given 9/6/17 0120)   heparin (porcine) 1,000 unit/mL injection 1,000-4,000 Units (2,000 Units IntraVENous Given 9/6/17 0125)   heparinized saline 2 units/mL infusion 1,000 Units (2,000 Units IntraarTERial New Bag 9/6/17 0131)   iopamidol (ISOVUE 250) 51 % contrast injection 101-150 mL (not administered)   lidocaine (XYLOCAINE) 10 mg/mL (1 %) injection 3-30 mL (3 mL IntraDERMal Given by Provider 9/6/17 0114)   midazolam (VERSED) injection 0.5-2 mg (1 mg IntraVENous Given 9/6/17 0120)   nitroglycerin 100 mcg/ml compounded injection (not administered)   nitroglycerin 1 mg/10mL 0.2 mg, verapamil 2.5 mg injection (3 mL IntraarTERial Given by Provider 9/6/17 0117)   verapamil (ISOPTIN) 2.5 mg/mL injection (not administered)   heparin (porcine) 1,000 unit/mL injection 4,000 Units (4,000 Units IntraVENous Given 9/6/17 0052)   nitroglycerin (NITROBID) 2 % ointment 1 Inch (1 Inch Topical Given 9/6/17 0057)       Procedures      PROGRESS NOTE:  12:39 AM  Initial assessment performed. Written by Bandar Lopes ED Scribe, as dictated by Quinton Meyer MD    CONSULT NOTE:   12:39 AM  Susy Pro. Daxa Meyer MD spoke with Kentrell Hatfield MD   Specialty: Cardiology   Discussed pt's hx, disposition, and available diagnostic and imaging results over the telephone. Reviewed care plans. Consulting physician agrees with plan to admit to ICU after cath lab. CLINICAL IMPRESSION:    1. Acute myocardial infarction of inferior wall, initial episode of care [facility])        1:13 AM  Patient is being admitted to the hospital by Sena Lee MD. The results of their tests and reasons for their admission have been discussed with them and/or available family. They convey agreement and understanding for the need to be admitted and for their admission diagnosis. PLAN: ADMIT TO ICU AFTER CATH LAB      ATTESTATIONS:  This note is prepared by Ector Tena and Qian Swan, acting as Scribes for [employer]. Liu Aguirre MD.    [employer]. Liu Aguirre MD: The scribe's documentation has been prepared under my direction and personally reviewed by me in its entirety. I confirm that the note above accurately reflects all work, treatment, procedures, and medical decision making performed by me.

## 2017-09-06 NOTE — H&P
Date of Surgery Update:  Lukas Hinojosa was seen and examined. History and physical has been reviewed. The patient has been examined.  There have been no significant clinical changes since the completion of the originally dated History and Physical.    Signed By: Kyra Graf MD     September 6, 2017 1:08 AM

## 2017-09-06 NOTE — IP AVS SNAPSHOT
Radha [address] [address] 
[phone] Patient: Gladis oJsé MRN: [id number] :1955 Current Discharge Medication List  
  
START taking these medications Dose & Instructions Dispensing Information Comments Morning Noon Evening Bedtime  
 aspirin delayed-release 81 mg tablet Your last dose was: Your next dose is:    
   
   
 Dose:  81 mg Take 1 Tab by mouth daily. Quantity:  30 Tab Refills:  0  
     
   
   
   
  
 losartan 25 mg tablet Commonly known as:  COZAAR Your last dose was: Your next dose is:    
   
   
 Dose:  25 mg Take 1 Tab by mouth daily. Quantity:  30 Tab Refills:  1  
     
   
   
   
  
 metoprolol tartrate 25 mg tablet Commonly known as:  LOPRESSOR Your last dose was: Your next dose is:    
   
   
 Dose:  12.5 mg Take 0.5 Tabs by mouth every twelve (12) hours. Quantity:  30 Tab Refills:  1  
     
   
   
   
  
 ticagrelor 90 mg tablet Commonly known as:  [employer] Your last dose was: Your next dose is:    
   
   
 Dose:  90 mg Take 1 Tab by mouth two (2) times a day. Quantity:  60 Tab Refills:  1 CONTINUE these medications which have CHANGED Dose & Instructions Dispensing Information Comments Morning Noon Evening Bedtime * nitroglycerin 0.4 mg SL tablet Commonly known as:  NITROSTAT What changed:  Another medication with the same name was added. Make sure you understand how and when to take each. Your last dose was: Your next dose is:    
   
   
 Dose:  0.4 mg  
0.4 mg by SubLINGual route every five (5) minutes as needed for Chest Pain. Refills:  0  
     
   
   
   
  
 * nitroglycerin 0.4 mg SL tablet Commonly known as:  NITROSTAT What changed:   You were already taking a medication with the same name, and this prescription was added. Make sure you understand how and when to take each. Your last dose was: Your next dose is:    
   
   
 Dose:  0.4 mg  
1 Tab by SubLINGual route as needed for Chest Pain. Quantity:  1 Bottle Refills:  0  
     
   
   
   
  
 * Notice: This list has 2 medication(s) that are the same as other medications prescribed for you. Read the directions carefully, and ask your doctor or other care provider to review them with you. CONTINUE these medications which have NOT CHANGED Dose & Instructions Dispensing Information Comments Morning Noon Evening Bedtime  
 ezetimibe 10 mg tablet Commonly known as:  Meg Huffman Your last dose was: Your next dose is:    
   
   
 Dose:  10 mg Take 10 mg by mouth daily. Refills:  0 PROTONIX 20 mg tablet Generic drug:  pantoprazole Your last dose was: Your next dose is:    
   
   
 Dose:  20 mg Take 20 mg by mouth two (2) times a day. Refills:  0 Where to Get Your Medications Information on where to get these meds will be given to you by the nurse or doctor. ! Ask your nurse or doctor about these medications  
  aspirin delayed-release 81 mg tablet  
 losartan 25 mg tablet  
 metoprolol tartrate 25 mg tablet  
 nitroglycerin 0.4 mg SL tablet  
 ticagrelor 90 mg tablet

## 2017-09-06 NOTE — PROGRESS NOTES
Readmission Risk Assessment: Low Risk and MSSP/[facility] Help [facility] patients    RRAT Score: 1 - 12    Initial Assessment:Emergency Contact: chart reviewed and spoke with patient at bedside,pt came to ED with c/o chest pains, lives at home with wife,denies using any home DME's ,no cm needs expected at discharge cm will cont to review and remain available if needed. See chart  Pertinent Medical Hx:   PCP/Specialists: Community Services:  in [place]    DME:     Low Risk Care Transition Plan:  1. Evaluate for [facility] or [address] coordination of resources  2. Involve patient/caregiver in assessment, planning, education and implement of intervention. 3. CM daily patient care huddles/interdisciplinary rounds. 4. PCP/Specialist appointment within 7 - 10 days made prior to discharge. 5. Facilitate transportation and logistics for follow-up appointments. 6. Handoff to [address] Nurse Navigator or PCP practice.

## 2017-09-06 NOTE — ACP (ADVANCE CARE PLANNING)
assisted patient in completing Advance Medical Directives. A copy was placed in the chart for scanning and extra copies were left for the patient.  completed visit with patient and offered Pastoral care. Chaplains will continue to follow and will provide pastoral care  as needed or requested. Also assisted wife in completing one. [id number] [facility]   Board Certified   [phone] - Office

## 2017-09-06 NOTE — PROGRESS NOTES
Cardiac Cath Lab:  Pre Procedure Chart Check     Patients chart was accessed and reviewed for possible and/or scheduled procedure. Creatinine Clearance:  CREATININE: 1.08 MG/DL (09/06/17 0039)  Estimated creatinine clearance: 76.8 mL/min    Total Contrast  Load:  3 x estimated clearance amount=  228   ml    75% of Contrast Load:  0.75 x Total Contrast Load=     171   ml    Recent Labs      09/06/17   0039   WBC  7.3   RBC  4.42*   HCT  43.3   HGB  15.0   PLT  223   APTT  29.5   NA  140   K  4.2   BUN  10   CREA  1.08   GFRAA  >60   GFRNA  >60   CA  8.7       BMI: Body mass index is 23.87 kg/(m^2). ALLERGIES:   Allergies   Allergen Reactions    Other Plant, Animal, Environmental Anaphylaxis     Tad    Shellfish Derived Anaphylaxis       Lines:        Peripheral IV 09/06/17 Left Antecubital (Active)   Site Assessment Clean, dry, & intact 9/6/2017 12:50 AM   Phlebitis Assessment 0 9/6/2017 12:50 AM   Dressing Status Clean, dry, & intact 9/6/2017 12:50 AM   Dressing Type Transparent 9/6/2017 12:50 AM       Peripheral IV 09/06/17 Right Forearm (Active)   Site Assessment Clean, dry, & intact 9/6/2017 12:39 AM   Phlebitis Assessment 0 9/6/2017 12:39 AM   Infiltration Assessment 0 9/6/2017 12:39 AM   Dressing Status Clean, dry, & intact 9/6/2017 12:39 AM   Hub Color/Line Status Patent 9/6/2017 12:39 AM          History:    Past Medical History:   Diagnosis Date    MI (myocardial infarction) ([facility] [address]      Past Surgical History:   Procedure Laterality Date    HX CORONARY STENT PLACEMENT  2015     There is no problem list on file for this patient.

## 2017-09-06 NOTE — PROGRESS NOTES
1662- Received from cardiac cath lab, assessment completed per flow sheet. Alert, pleasant, cooperative. Reports mid-sternal discomfort 1/10, much improved from arrival to ED, Landon from cath lab reports that  aware of current chest discomfort. Left wrist puncture site D/I, no bleeding, no oozing, TR band in place and inflated, cath  states he will be back around 4 to remove. Integrilin infusing via IV pump without difficulty. 0500- Cath  in, TR band removed, armboard left in place. 0505- Left wrist dressing D/I.

## 2017-09-06 NOTE — ED NOTES
Double verified the order to start heparin drip as ordered. He verbally acknowledges and has spoken to Dr Zenia Hutton.

## 2017-09-06 NOTE — IP AVS SNAPSHOT
Sierra Mcnair 
 
 
 [address] [place] [address] 
[phone] Patient: Soundra Galeazzi MRN: [id number] :1955 You are allergic to the following Allergen Reactions Other Plant, Animal, Environmental Anaphylaxis Marlinda Bugler Shellfish Derived Anaphylaxis Statins-Hmg-Coa Reductase Inhibitors Myalgia Other (comments) Abnormal LFTs Recent Documentation Height Weight BMI Smoking Status 1.829 m 79.8 kg 23.87 kg/m2 Current Every Day Smoker Emergency Contacts Name Discharge Info Relation Home Work Mobile RuizAlyson DISCHARGE CAREGIVER [3] Spouse [3] [phone] About your hospitalization You were admitted on:  2017 You last received care in the:  THE [facility] [address] You were discharged on:  2017 Unit phone number:  [phone] Why you were hospitalized Your primary diagnosis was:  Not on File Your diagnoses also included:  St Elevation (Stemi) Myocardial Infarction Involving Right Coronary Artery (Hcc), Alcohol Use, Smoker Providers Seen During Your Hospitalizations Provider Role Specialty Primary office phone Antonina Wilkins MD Attending Provider Emergency Medicine [phone] Rosita Griffith MD Attending Provider Cardiology [phone] Your Primary Care Physician (PCP) Primary Care Physician Office Phone Office Fax Mariah Van [phone] Follow-up Information Follow up With Details Comments Contact Info Dejon Zamora DO    [facility], [address] [place] [address] 
[phone] Jalen Maurer MD   [address] [facility] [address] [facility] 
[phone] Daljit Tavarez DO    [address] [address] 
[phone] Current Discharge Medication List  
  
 START taking these medications Dose & Instructions Dispensing Information Comments Morning Noon Evening Bedtime  
 aspirin delayed-release 81 mg tablet Your last dose was: Your next dose is:    
   
   
 Dose:  81 mg Take 1 Tab by mouth daily. Quantity:  30 Tab Refills:  0  
     
   
   
   
  
 losartan 25 mg tablet Commonly known as:  COZAAR Your last dose was: Your next dose is:    
   
   
 Dose:  25 mg Take 1 Tab by mouth daily. Quantity:  30 Tab Refills:  1  
     
   
   
   
  
 metoprolol tartrate 25 mg tablet Commonly known as:  LOPRESSOR Your last dose was: Your next dose is:    
   
   
 Dose:  12.5 mg Take 0.5 Tabs by mouth every twelve (12) hours. Quantity:  30 Tab Refills:  1  
     
   
   
   
  
 ticagrelor 90 mg tablet Commonly known as:  [employer] Your last dose was: Your next dose is:    
   
   
 Dose:  90 mg Take 1 Tab by mouth two (2) times a day. Quantity:  60 Tab Refills:  1 CONTINUE these medications which have CHANGED Dose & Instructions Dispensing Information Comments Morning Noon Evening Bedtime * nitroglycerin 0.4 mg SL tablet Commonly known as:  NITROSTAT What changed:  Another medication with the same name was added. Make sure you understand how and when to take each. Your last dose was: Your next dose is:    
   
   
 Dose:  0.4 mg  
0.4 mg by SubLINGual route every five (5) minutes as needed for Chest Pain. Refills:  0  
     
   
   
   
  
 * nitroglycerin 0.4 mg SL tablet Commonly known as:  NITROSTAT What changed: You were already taking a medication with the same name, and this prescription was added. Make sure you understand how and when to take each. Your last dose was: Your next dose is:    
   
   
 Dose:  0.4 mg  
1 Tab by SubLINGual route as needed for Chest Pain. Quantity:  1 Bottle Refills:  0  
     
   
   
   
  
 * Notice: This list has 2 medication(s) that are the same as other medications prescribed for you. Read the directions carefully, and ask your doctor or other care provider to review them with you. CONTINUE these medications which have NOT CHANGED Dose & Instructions Dispensing Information Comments Morning Noon Evening Bedtime  
 ezetimibe 10 mg tablet Commonly known as:  Kary Daniel Your last dose was: Your next dose is:    
   
   
 Dose:  10 mg Take 10 mg by mouth daily. Refills:  0 PROTONIX 20 mg tablet Generic drug:  pantoprazole Your last dose was: Your next dose is:    
   
   
 Dose:  20 mg Take 20 mg by mouth two (2) times a day. Refills:  0 Where to Get Your Medications Information on where to get these meds will be given to you by the nurse or doctor. ! Ask your nurse or doctor about these medications  
  aspirin delayed-release 81 mg tablet  
 losartan 25 mg tablet  
 metoprolol tartrate 25 mg tablet  
 nitroglycerin 0.4 mg SL tablet  
 ticagrelor 90 mg tablet Discharge Instructions Taking Aspirin to Prevent Heart Attack and Stroke: Care Instructions Your Care Instructions Aspirin acts as a \"blood thinner. \" It prevents blood clots from forming. When taken during and after a heart attack, it can reduce your chance of dying. And it's used if you have a stent in your coronary artery. Also, aspirin helps certain people lower their risk of a heart attack or stroke. Be sure you know what dose of aspirin to take and how often to take it. Low-dose aspirin is typically 81 mg. But the dose for daily aspirin can range from 81 mg to 325 mg. Taking aspirin every day can cause bleeding. It may not be safe if you have stomach ulcers.  And it may not be safe if you have high blood pressure that is not controlled. If you take aspirin pills every day, do not take ones that have other ingredients such as caffeine or sodium. Before you start to take aspirin, tell your doctor all the medicines, vitamins, herbal products, and supplements you take. Follow-up care is a key part of your treatment and safety. Be sure to make and go to all appointments, and call your doctor if you are having problems. It's also a good idea to know your test results and keep a list of the medicines you take. How can you care for yourself at home? · Take aspirin with a full glass of water unless your doctor tells you not to. Do not lie down right after you take it. · If you have a stent in your coronary artery, take your aspirin as your heart doctor says to. If another doctor says to stop taking the aspirin for any reason, talk to your heart doctor before you stop. · Do not chew or crush the coated or sustained-release forms of aspirin. · Ask your doctor if you can drink alcohol while you take aspirin. And ask how much you can drink. Too much alcohol with aspirin can cause stomach bleeding. · Do not take aspirin if you are pregnant, unless your doctor says it is okay. · Keep all aspirin out of children's reach. · Throw aspirin away if it starts to smell like vinegar. · Do not take aspirin if you have gout or if you take prescription blood thinners, unless your doctor has told you to. · Do not take prescription or over-the-counter medicines, vitamins, herbal products, or supplements without talking to your doctor first. Naya Toa the label before you take another over-the-counter medicine. Many contain aspirin. So they could cause you to take too much aspirin. · Talk with your doctor before you take a pain medicine. Ask which type of medicine you can take and how to take it safely with aspirin.  
· Tell your doctor or dentist before a surgery or procedure that you take aspirin. He or she will tell you if you should stop taking aspirin before your surgery or procedure. Make sure that you understand exactly what your doctor wants you to do. Where can you learn more? Go to http://jeff-nkechi.info/. Enter T760 in the search box to learn more about \"Taking Aspirin to Prevent Heart Attack and Stroke: Care Instructions. \" Current as of: April 3, 2017 Content Version: 11.3 © 3513-1727 [employer]. Care instructions adapted under license by [employer] (which disclaims liability or warranty for this information). If you have questions about a medical condition or this instruction, always ask your healthcare professional. [place] [address] any warranty or liability for your use of this information. Learning About Screening for Heart Attack and Stroke Risk What is screening for heart attack and stroke risk? Screening for heart attack and stroke risk is a way for your doctor to check your chance of having a problem called atherosclerosis. This problem is also called hardening of the arteries. It is the starting point for most heart and blood flow problems, such as heart disease, stroke, and peripheral arterial disease. You and your doctor can use your risk score to decide if you want to take steps to lower your risk. How can you find out your risk? Your doctor looks at things that put you at risk for a heart attack and stroke. He or she might look at many things, such as: 
· Your cholesterol levels. · Your blood pressure. · Your age. · Your race. · Whether you are male or female. · Whether or not you smoke. Your doctor might use a tool to calculate a risk score for you. There are different tools that doctors use. They may show that your risk is higher or lower than it really is. But the tools give you and your doctor a good idea about your risk. What happens after screening? Knowing your risk can help you and your doctor talk about whether to take steps to lower your risk. A heart-healthy lifestyle is important for everyone. Some people also take medicine to lower their risk. You and your doctor can work together to decide what is best for you. Where can you learn more? Go to http://jeff-nkechi.info/. Enter Z134 in the search box to learn more about \"Learning About Screening for Heart Attack and Stroke Risk. \" Current as of: October 26, 2016 Content Version: 11.3 © 8225-3227 [employer]. Care instructions adapted under license by [employer] (which disclaims liability or warranty for this information). If you have questions about a medical condition or this instruction, always ask your healthcare professional. [address] any warranty or liability for your use of this information. Ready to quit: Not Answered Counseling given: Not Answered Patient armband removed and shredded Alla Stevenson DISCHARGE SUMMARY from Nurse The following personal items are in your possession at time of discharge: 
 
Dental Appliances: Uppers, Lowers, Partials, At home Visual Aid: Glasses, With patient Home Medications: None Clothing: None Other Valuables: Cell Phone, With patient Personal Items Sent to Safe: none PATIENT INSTRUCTIONS: 
 
 
F-face looks uneven A-arms unable to move or move unevenly S-speech slurred or non-existent T-time-call 911 as soon as signs and symptoms begin-DO NOT go Back to bed or wait to see if you get better-TIME IS BRAIN. Warning Signs of HEART ATTACK Call 911 if you have these symptoms: ? Chest discomfort. Most heart attacks involve discomfort in the center of the chest that lasts more than a few minutes, or that goes away and comes back. It can feel like uncomfortable pressure, squeezing, fullness, or pain. ? Discomfort in other areas of the upper body. Symptoms can include pain or discomfort in one or both arms, the back, neck, jaw, or stomach. ? Shortness of breath with or without chest discomfort. ? Other signs may include breaking out in a cold sweat, nausea, or lightheadedness. Don't wait more than five minutes to call [address]! Fast action can save your life. Calling 911 is almost always the fastest way to get lifesaving treatment. Emergency Medical Services staff can begin treatment when they arrive  up to an hour sooner than if someone gets to the hospital by car. The discharge information has been reviewed with the patient. The patient verbalized understanding. Discharge medications reviewed with the patient and appropriate educational materials and side effects teaching were provided. Reviewed discharge medication with Belinda Vásquez RN Discharge Orders None [employer] Announcement We are excited to announce that we are making your provider's discharge notes available to you in [employer]. You will see these notes when they are completed and signed by the physician that discharged you from your recent hospital stay. If you have any questions or concerns about any information you see in [employer], please call the Health Information Department where you were seen or reach out to your Primary Care Provider for more information about your plan of care. Introducing [facility] SERVICES! Sushant Alfredo introduces [employer] patient portal. Now you can access parts of your medical record, email your doctor's office, and request medication refills online. 1. In your internet browser, go to [email or web address] 2. Click on the First Time User? Click Here link in the Sign In box. You will see the New Member Sign Up page. 3. Enter your SeeToo Access Code exactly as it appears below. You will not need to use this code after youve completed the sign-up process. If you do not sign up before the expiration date, you must request a new code. · SeeToo Access Code: [email or web address] Expires: 12/8/2017 11:14 AM 
 
4. Enter the last four digits of your Social Security Number (xxxx) and Date of Birth (mm/dd/yyyy) as indicated and click Submit. You will be taken to the next sign-up page. 5. Create a SeeToo ID. This will be your SeeToo login ID and cannot be changed, so think of one that is secure and easy to remember. 6. Create a SeeToo password. You can change your password at any time. 7. Enter your Password Reset Question and Answer. This can be used at a later time if you forget your password. 8. Enter your e-mail address. You will receive e-mail notification when new information is available in [address] 9. Click Sign Up. You can now view and download portions of your medical record. 10. Click the Download Summary menu link to download a portable copy of your medical information. If you have questions, please visit the Frequently Asked Questions section of the SeeToo website. Remember, SeeToo is NOT to be used for urgent needs. For medical emergencies, dial 911. Now available from your iPhone and Android! General Information Please provide this summary of care documentation to your next provider. Patient Signature:  ____________________________________________________________ Date:  ____________________________________________________________  
  
Arna Porsha Provider Signature:  ____________________________________________________________ Date:  ____________________________________________________________

## 2017-09-06 NOTE — PROGRESS NOTES
conducted an initial consultation and Spiritual Assessment for Ele Madrid, who is a 60 y.o.,male. According to the patients chart Christian Affiliation is: No Adventism. Patient stated that he feels \"ronaldo and blessed\" that this was caught. He is [place] from [place]. He moved here with his wife (\"it was that or be left behind\") to be closer to her family and their kids and grandkids. [place]. He stated that he has a good support system. The reason the Patient came to the hospital is:   Patient Active Problem List    Diagnosis Date Noted    ST elevation (STEMI) myocardial infarction involving right coronary artery ([facility] [address] 09/06/2017        The  provided the following Interventions:  Initiated a relationship of care and support. Explored issues of mir, belief, spirituality and Restoration/ritual needs while hospitalized. Listened empathically. Provided information about Spiritual Care Services. Offered prayer and assurance of continued prayers on patients behalf. Chart reviewed. The following outcomes were achieved:  Patient shared limited information about their medical narrative, spiritual journey and beliefs.  confirmed Patient's Christian Affiliation. Patient processed feelings about current hospitalization. Patient expressed gratitude for Spiritual Care visit. Assessment:  There are no significant spiritual or Restoration issues which require further intervention at this time. Patient does not have any Restoration or cultural needs that will affect patients preferences in health care. Plan:  Chaplains will continue to follow and will provide pastoral care as needed or requested.  recommends bedside caregivers page  on duty if patient shows signs of acute spiritual or emotional distress. [address] [facility], MJoanDiv.   Board Certified   [phone] - Office

## 2017-09-07 PROBLEM — Z78.9 ALCOHOL USE: Status: ACTIVE | Noted: 2017-09-07

## 2017-09-07 PROBLEM — F17.200 SMOKER: Status: ACTIVE | Noted: 2017-09-07

## 2017-09-07 LAB
ALBUMIN SERPL-MCNC: 3.3 G/DL (ref 3.4–5)
ALBUMIN/GLOB SERPL: 0.9 {RATIO} (ref 0.8–1.7)
ALP SERPL-CCNC: 72 U/L (ref 45–117)
ALT SERPL-CCNC: 17 U/L (ref 16–61)
ANION GAP SERPL CALC-SCNC: 10 MMOL/L (ref 3–18)
AST SERPL-CCNC: 29 U/L (ref 15–37)
BILIRUB SERPL-MCNC: 0.5 MG/DL (ref 0.2–1)
BUN SERPL-MCNC: 15 MG/DL (ref 7–18)
BUN/CREAT SERPL: 16 (ref 12–20)
CALCIUM SERPL-MCNC: 9 MG/DL (ref 8.5–10.1)
CHLORIDE SERPL-SCNC: 102 MMOL/L (ref 100–108)
CHOLEST SERPL-MCNC: 172 MG/DL
CK MB CFR SERPL CALC: 2.8 % (ref 0–4)
CK MB SERPL-MCNC: 2.8 NG/ML (ref 5–25)
CK SERPL-CCNC: 99 U/L (ref 39–308)
CO2 SERPL-SCNC: 26 MMOL/L (ref 21–32)
CREAT SERPL-MCNC: 0.92 MG/DL (ref 0.6–1.3)
GLOBULIN SER CALC-MCNC: 3.8 G/DL (ref 2–4)
GLUCOSE SERPL-MCNC: 90 MG/DL (ref 74–99)
HBA1C MFR BLD: 5.1 % (ref 4.5–5.6)
HDLC SERPL-MCNC: 53 MG/DL (ref 40–60)
HDLC SERPL: 3.2 {RATIO} (ref 0–5)
LDLC SERPL CALC-MCNC: 85.4 MG/DL (ref 0–100)
LIPID PROFILE,FLP: ABNORMAL
POTASSIUM SERPL-SCNC: 4.3 MMOL/L (ref 3.5–5.5)
PROT SERPL-MCNC: 7.1 G/DL (ref 6.4–8.2)
SODIUM SERPL-SCNC: 138 MMOL/L (ref 136–145)
TRIGL SERPL-MCNC: 168 MG/DL (ref ?–150)
TROPONIN I SERPL-MCNC: 1.42 NG/ML (ref 0–0.06)
VLDLC SERPL CALC-MCNC: 33.6 MG/DL

## 2017-09-07 PROCEDURE — 65610000006 HC RM INTENSIVE CARE

## 2017-09-07 PROCEDURE — 77010033678 HC OXYGEN DAILY

## 2017-09-07 PROCEDURE — 82550 ASSAY OF CK (CPK): CPT | Performed by: INTERNAL MEDICINE

## 2017-09-07 PROCEDURE — 74011250636 HC RX REV CODE- 250/636: Performed by: INTERNAL MEDICINE

## 2017-09-07 PROCEDURE — 74011250637 HC RX REV CODE- 250/637: Performed by: INTERNAL MEDICINE

## 2017-09-07 PROCEDURE — 93005 ELECTROCARDIOGRAM TRACING: CPT

## 2017-09-07 PROCEDURE — 80053 COMPREHEN METABOLIC PANEL: CPT | Performed by: INTERNAL MEDICINE

## 2017-09-07 PROCEDURE — 80061 LIPID PANEL: CPT | Performed by: INTERNAL MEDICINE

## 2017-09-07 PROCEDURE — 36415 COLL VENOUS BLD VENIPUNCTURE: CPT | Performed by: INTERNAL MEDICINE

## 2017-09-07 RX ORDER — PANTOPRAZOLE SODIUM 40 MG/1
40 TABLET, DELAYED RELEASE ORAL DAILY
Status: COMPLETED | OUTPATIENT
Start: 2017-09-07 | End: 2017-09-08

## 2017-09-07 RX ORDER — PANTOPRAZOLE SODIUM 40 MG/1
20 TABLET, DELAYED RELEASE ORAL 2 TIMES DAILY
Status: DISCONTINUED | OUTPATIENT
Start: 2017-09-07 | End: 2017-09-07

## 2017-09-07 RX ORDER — EZETIMIBE 10 MG/1
10 TABLET ORAL DAILY
Status: DISCONTINUED | OUTPATIENT
Start: 2017-09-08 | End: 2017-09-09 | Stop reason: HOSPADM

## 2017-09-07 RX ORDER — LOSARTAN POTASSIUM 50 MG/1
25 TABLET ORAL DAILY
Status: DISCONTINUED | OUTPATIENT
Start: 2017-09-07 | End: 2017-09-09 | Stop reason: HOSPADM

## 2017-09-07 RX ADMIN — METOPROLOL TARTRATE 12.5 MG: 25 TABLET ORAL at 21:04

## 2017-09-07 RX ADMIN — TICAGRELOR 90 MG: 90 TABLET ORAL at 21:04

## 2017-09-07 RX ADMIN — Medication 10 ML: at 08:02

## 2017-09-07 RX ADMIN — METOPROLOL TARTRATE 12.5 MG: 25 TABLET ORAL at 08:00

## 2017-09-07 RX ADMIN — ENOXAPARIN SODIUM 40 MG: 40 INJECTION SUBCUTANEOUS at 08:05

## 2017-09-07 RX ADMIN — LOSARTAN POTASSIUM 25 MG: 50 TABLET ORAL at 17:57

## 2017-09-07 RX ADMIN — PANTOPRAZOLE SODIUM 40 MG: 40 TABLET, DELAYED RELEASE ORAL at 18:25

## 2017-09-07 RX ADMIN — TICAGRELOR 90 MG: 90 TABLET ORAL at 08:00

## 2017-09-07 RX ADMIN — Medication 10 ML: at 14:00

## 2017-09-07 RX ADMIN — ASPIRIN 81 MG: 81 TABLET, COATED ORAL at 08:00

## 2017-09-07 NOTE — ROUTINE PROCESS
Bedside, Verbal and Written shift change report given to MADI Graham (oncoming nurse) by Brittany SaenzRN (offgoing nurse). Report included the following information SBAR, Kardex, Intake/Output and Recent Results. Assumed care of pt at this time. Pt aox4, following commands, states he has CP 3/10 \" states chest feels a little tight\", appears asymptomatic. Remains on telemetry, room air, up at taya. Will continue to monitor. 1238  CP unrelieved, EKG done, Dr. Reba Quiros paged. Pt appears asymptomatic, VSS, Pain does not radiate or feel crushing at this time. 1250  Dr. Reba Quiros at bedside assessing pt. Pt status unchanged. Orders received. 1600  Pt status unchanged, resting in bed watching TV. VSS  1815  Dr. Reba Quiros at bedside, orders received, if pt continues to have chest pain after Protonix, night shift to keep pt NPO for possible cath. Protonix given. Pt tolerated well. Pt co chest pain after eating dinner. 3/10.  1915    Bedside, Verbal and Written shift change report given to JOSE Rey (oncoming nurse) by Sheng Graham (offgoing nurse). Report included the following information SBAR, Kardex, Intake/Output and Recent Results.

## 2017-09-07 NOTE — PROGRESS NOTES
This nurse into speak with pt per Dr Nguyen Ramy request, pt given 30 day supply of brilinta given to pt with information packet,  Action of medication reviewed with pt with stress of importance to take medication. Pt verbalizes understanding. Information packed on stent given to pt, pt states wife has his stent card, bio informations sheet of Dr Reena Sauer given. Pt encourage to ask questions.  Verbalizes understanding

## 2017-09-07 NOTE — PROGRESS NOTES
Cardiology Progress Note        Patient: Roberto Fraser        Sex: male          DOA: 9/6/2017  YOB: 1955      Age:  60 y.o.        LOS:  LOS: 1 day    Patient seen and examined and chart reviewed. Assessment/Plan     Patient Active Problem List   Diagnosis Code    ST elevation (STEMI) myocardial infarction involving right coronary artery (HCC) I21.11    Alcohol use Z78.9    Smoker F17.200      Chest pain   S/p PCI of distal RCA, mild to moderate LAD disease   Acute systolic heart failure NYHA class II, compensated LVEF 45%  Echocardiogram revealed Grade I diastolic dysfunction, basal and mid inferior, basal and mid inferoseptal, basal and mid inferolateral, apical septal and apical inferior hypokinesis. LVEF 45%    Plan:    Continue aspirin, brilinta and beta blocker. He had abnormal LFTs and severe myalgia from different statin in the past.   EKG done today revealed Sinus rhythm, LBBB (Developed on day of MI)   Do CMP and cardiac enzymes   Add Losartan 25 mg PO once a day. Continue DVT prophylaxis. Hospital medicine consultation   Further cardiac work up as clinically indicated. Plan discussed with patient and he verbally understood. Subjective:    cc: I had chest pain after I sat in chair. Lasted for 10-15 minutes, similar pain like MI but was less intense and associated with shortness of breath. Currently pain free      REVIEW OF SYSTEMS:     General: No fevers or chills. Cardiovascular: No chest pain,No palpitations, No orthopnea, No PND, No leg swelling, No claudication  Pulmonary: No shortness of breath. Gastrointestinal: No nausea, vomiting, bleeding  Neurology: No Dizziness    Objective:      Visit Vitals    /84    Pulse (!) 56    Temp 98 °F (36.7 °C)    Resp 17    Ht 6' (1.829 m)    Wt 79.8 kg (176 lb)    SpO2 99%    BMI 23.87 kg/m2     Body mass index is 23.87 kg/(m^2).     Physical Exam:  General Appearance: Comfortable, not using accessory muscles of respiration. HEENT: ASHISH. HEAD: Atraumatic  NECK: No JVD, no thyroidomeglay. CAROTIDS:  LUNGS: Clear bilaterally. HEART: S1+S2 audible, no murmur, no pericardial rub. ABD: Non-tender, BS Audible    EXT: No edema, and no cyanosis. VASCULAR EXAM: Pulses are intact. PSYCHIATRIC EXAM: Mood is appropriate. MUSCULOSKELETAL: Grossly no joint deformity. NEUROLOGICAL: No motor and sensory deficit, Cranial nerves II-XII intact.   Medication:  Current Facility-Administered Medications   Medication Dose Route Frequency    nitroglycerin (NITROSTAT) tablet 0.4 mg  0.4 mg SubLINGual PRN    sodium chloride (NS) flush 5-10 mL  5-10 mL IntraVENous Q8H    sodium chloride (NS) flush 5-10 mL  5-10 mL IntraVENous PRN    aspirin delayed-release tablet 81 mg  81 mg Oral DAILY    metoprolol tartrate (LOPRESSOR) tablet 12.5 mg  12.5 mg Oral Q12H    ticagrelor (BRILINTA) tablet 90 mg  90 mg Oral BID    enoxaparin (LOVENOX) injection 40 mg  40 mg SubCUTAneous Q24H               Lab/Data Reviewed:       Recent Labs      09/06/17   0930  09/06/17   0039   WBC  7.5  7.3   HGB  14.2  15.0   HCT  41.9  43.3   PLT  188  223     Recent Labs      09/07/17   1315  09/06/17   0930  09/06/17   0039   NA  138  137  140   K  4.3  3.7  4.2   CL  102  103  103   CO2  26  25  27   GLU  90  105*  113*   BUN  15  11  10   CREA  0.92  0.95  1.08   CA  9.0  8.7  8.7       Signed By: Mabel Del Toro MD     September 7, 2017

## 2017-09-07 NOTE — H&P
History and Physical    Patient: Mirna Tamez               Sex: male          DOA: 9/6/2017       YOB: 1955      Age:  60 y.o.        LOS:  LOS: 0 days        Reason for Admission:ST elevation MI    HPI:     Mirna Tamez is a 60 y.o. male with known history of coronary artery disease and PCI in 2015 came for worsening of chest pain since 4 p.m. yesterday. He developed substernal pressure-like chest pain around 4 p.m. Yesterday evening, he took sublingual nitroglycerin with partial relief. Initially pain was 10 out of 10 but it reduced to 6 out of 10 and he tried to take some rest but pain did not relieved. Prior to emergency room arrival his chest pain got worse and he called 911. Chest pain was associated with shortness of breath. He had similar chest pain when he had MI in 2015. At present he has 8 out of 10 chest pain. He is smoking for long time. He is not on any statin medication due to intolerance. He tried multiple statin in  But could not tolerate it. His primary cardiologist started cholesterol medication but does not remember the name. Past Medical History:   Diagnosis Date    MI (myocardial infarction) ([facility] [address]       H/o Aortic aneurysm repair     No current facility-administered medications on file prior to encounter. No current outpatient prescriptions on file prior to encounter. Home medication : Aspirin and Pantoprazole   Allergies   Allergen Reactions    Other Plant, Animal, Environmental Anaphylaxis     [place]    Shellfish Derived Anaphylaxis    Statins-Hmg-Coa Reductase Inhibitors Myalgia and Other (comments)     Abnormal LFTs         Social History:   Tobacco use: Smoking for long time    Alcohol use: Denies      Family History: No family h/o premature CAD       Review of Systems    Gen: No fever, chills, malaise, weight loss/gain. Heent: No headache, rhinorrhea, epistaxis, ear pain, hearing loss, sinus pain, neck pain/stiffness, sore throat.    Heart: Positive chest pain, No palpitations, shortness of breath, pnd, or orthopnea. Resp: No cough, hemoptysis, wheezing and dyspnea. GI: No nausea, vomiting, diarrhea, constipation, melena or hematochezia. : No urinary obstruction, dysuria or hematuria. Derm: No rash, new skin lesion or pruritis. Musc/skeletal: no bone or joint complains. Vasc: No edema, cyanosis or claudication. Endo: No heat/cold intolerance, no polyuria,polydipsia or polyphagia. Neuro: No unilateral weakness, numbness, tingling. No seizures. Heme: No easy bruising or bleeding. Physical Exam:      Visit Vitals    /69    Pulse 61    Temp 97.7 °F (36.5 °C)    Resp 20    Ht 6' (1.829 m)    Wt 79.8 kg (176 lb)    SpO2 98%    BMI 23.87 kg/m2       Physical Exam:    General Appearance: In mild distress, not using accessory muscles of respiration. HEENT: ASHISH. HEAD: Atraumatic  NECK: No JVD, no thyroidomeglay. CAROTIDS: No bruit  LUNGS: Clear bilaterally. HEART: S1+S2 audible, no murmur, no pericardial rub. ABD: Non-tender, BS Audible    EXT: No adema, and no cyanosis. VASCULAR EXAM: Pulses are intact. PSYCHIATRIC EXAM: Mood is appropriate. MUSCULOSKELETAL: AAO times 3, Grossly no joint deformity. Laboratory Studies:    Labs are pending    Assessment/Plan     STEMI  CAD h/o PCI  H/o Aortic aneurysm repair  Smoker    EKG revealed sinus rhythm, ST elevation in inferior leads. PLAN:    Patient received aspirin and heparin in the emergency room. In view of Chest pain with ST elevation in inferior leads advised about emergent LEFT heart catheterization and coronary angiogram plus or minus PCI. All risks, benefits and alternatives explained to patient and he verbally understood. Proceed for LEFT heart catheterization and coronary angiogram.  Further recommendation to follow.

## 2017-09-07 NOTE — CONSULTS
Medicine Consult    Patient:  Meri Pappas 60 y.o. male  Asked to evaluate patient by Dr. Susan Youssef  Primary Care Provider:  Ina Schofield DO  Date of Admission:  9/6/2017  Reason for Consult: medical management, alcohol use        Assessment/Plan     Patient Active Problem List   Diagnosis Code    ST elevation (STEMI) myocardial infarction involving right coronary artery (HCC) I21.11    Alcohol use Z78.9    Smoker F17.200       PLAN:    STEMI - s/p stent to distal RCA, STEMI and post cath management per cardiology. Alcohol use - no history of withdrawal symptoms, will place him on thiamine, folic acid and mvt. CIWA if he has symptoms of withdrawal.    Smoking - counseled. CXR with no acute findings. History of long term smoking, possible COPD, advised follow up with PCP. History of elevated LFT and muscle pain with statins in the past.     Blood sugars in normal range. Check HbA1c. Thank you for allowing us to participate in this patients care. HPI:   CC:  Meri Pappas is a 60 y.o. male with past medical history significant for CAD, MI is admitted by cardiology for STEMI, he is s/p cath and stenting of distal RCA. Patient denies any other past history of DM, HTN or lung problems. He reports that he is on other cholesterol medication, not statin. He reports that with statin his liver enzymes were elevated and he had significant muscle pain. He was taken off the statins by Dr. Martín Ye. He smokes about a pack per day and drinks 2-3 beers daily. He did not had any withdrawal symptoms and denies any wheezing. CXR done did not show any acute finding.     Past Medical History:   Diagnosis Date    MI (myocardial infarction) ([facility] [address]      Past Surgical History:   Procedure Laterality Date    HX CORONARY STENT PLACEMENT  2015      Social History   Substance Use Topics    Smoking status: Current Every Day Smoker     Packs/day: 1.50    Smokeless tobacco: None    Alcohol use 12.6 oz/week     21 Cans of beer per week     History reviewed. No pertinent family history. No current facility-administered medications on file prior to encounter. No current outpatient prescriptions on file prior to encounter. Allergies   Allergen Reactions    Other Plant, Animal, Environmental Anaphylaxis     [place]    Shellfish Derived Anaphylaxis    Statins-Hmg-Coa Reductase Inhibitors Myalgia and Other (comments)     Abnormal LFTs           Review of Systems  Constitutional: No fever, chills, diaphoresis, malaise, fatigue or weight gain/loss or falls  Skin: no itching or rashes  HEENT: no ear discomfort, hearing loss, tinnitus, epistaxis or sore throat  EYES: no blurry vision, double vision or photophobia  CARDIOVASCULAR: see above  PULMONARY: no cough, wheeze, shortness of breath or sputum production  GI: no nausea, vomiting, diarrhea, abdominal pain, melena, hematemesis or brbpr  : no dysuria, hematuria  MUSCULOSKELETAL: no back pain, joint pain or myalgias  ENDOCRINE: no heat/cold intolerance, polyuria or polydipsia  HEME: no easy bruising or bleeding  NEURO: no unilateral weakness, numbness, tingling or seizures      Physical Exam:      Visit Vitals    /67    Pulse (!) 57    Temp 98.3 °F (36.8 °C)    Resp 19    Ht 6' (1.829 m)    Wt 79.8 kg (176 lb)    SpO2 99%    BMI 23.87 kg/m2     Body mass index is 23.87 kg/(m^2).     Physical Exam:  GEN: well nourished, laying in bed in no acute distress  HEENT: atraumatic, nose normal,oropharynx clear, MMM  NECK: supple, trachea midline, no supraclavicular or submandibular adenopathy noted  EYES: conjuctiva normal, lids with out lesions, PERRL  HEART: RRR with out m/r/g, pmi nondisplaced, pulses 2+ distally  LUNGS: equal chest wall expansion, cta bl with out wheezes/rales or rhonchi  AB: soft, +BS, nt/nd no organomegaly  NEURO: alert, awake and oriented x3, gait not assessed, cranial nerves intact, strength 5/5 bl UE and LE, sensation intact, reflexes nonpathological  SKIN: dry, intact, warm no breakdown noted        Laboratory Studies:    Recent Results (from the past 24 hour(s))   EKG, 12 LEAD, SUBSEQUENT    Collection Time: 09/07/17 12:45 PM   Result Value Ref Range    Ventricular Rate 56 BPM    Atrial Rate 56 BPM    P-R Interval 230 ms    QRS Duration 140 ms    Q-T Interval 460 ms    QTC Calculation (Bezet) 443 ms    Calculated P Axis 58 degrees    Calculated R Axis 8 degrees    Calculated T Axis -141 degrees    Diagnosis       Sinus bradycardia with 1st degree AV block  Left bundle branch block  Abnormal ECG  When compared with ECG of 06-SEP-2017 13:00,  fusion complexes are no longer present  premature ventricular complexes are no longer present  [place] interval has increased  Left bundle branch block is now present

## 2017-09-07 NOTE — PROGRESS NOTES
Initial assessemnt  Completed. AAOX4. Pleasant & coperative. No c/o of any kind of pain. Monitor shows nsr w/ rare pvc. Afebrile. On RA w/out sob. Left wrist dressing dry & intact w/ strong palpable pulse & color good warm & dry. 1000- VS stable. Voided in fair amount. No c/o of any pain Watching TV.    1100-. Bedside and Verbal shift change report given to [address] (oncoming nurse) by Matthias Escudero RN (offgoing nurse). Report included the following information SBAR, Kardex, ED Summary, OR Summary, Procedure Summary, Intake/Output, MAR and Accordion.

## 2017-09-07 NOTE — DIABETES MGMT
GLYCEMIC CONTROL & NUTRITION:      - Discussed in rounds and chart reviewed. BG currently within target range. No glycemic control needs identified at this time. Lab Results   Component Value Date/Time    Glucose 105 09/06/2017 09:30 AM    Glucose,  09/06/2017 12:49 AM       CODY Moore, MPH, RD, CDE

## 2017-09-07 NOTE — PROGRESS NOTES
Report rec'd from Brandenrivera Delacruz RN. Shift assessment done. NAD VSS    Bedside and Verbal shift change report given to iggy Campbell rn (oncoming nurse) by sylvester Mcmillan (offgoing nurse). Report included the following information SBAR, Kardex, Recent Results, Med Rec Status, Cardiac Rhythm nsr bbb pvc and Alarm Parameters .

## 2017-09-08 LAB
ANION GAP SERPL CALC-SCNC: 9 MMOL/L (ref 3–18)
BUN SERPL-MCNC: 17 MG/DL (ref 7–18)
BUN/CREAT SERPL: 17 (ref 12–20)
CALCIUM SERPL-MCNC: 9.6 MG/DL (ref 8.5–10.1)
CHLORIDE SERPL-SCNC: 99 MMOL/L (ref 100–108)
CO2 SERPL-SCNC: 29 MMOL/L (ref 21–32)
CREAT SERPL-MCNC: 1.02 MG/DL (ref 0.6–1.3)
GLUCOSE SERPL-MCNC: 86 MG/DL (ref 74–99)
MAGNESIUM SERPL-MCNC: 1.7 MG/DL (ref 1.6–2.6)
POTASSIUM SERPL-SCNC: 3.8 MMOL/L (ref 3.5–5.5)
SODIUM SERPL-SCNC: 137 MMOL/L (ref 136–145)

## 2017-09-08 PROCEDURE — 36415 COLL VENOUS BLD VENIPUNCTURE: CPT | Performed by: INTERNAL MEDICINE

## 2017-09-08 PROCEDURE — 74011250636 HC RX REV CODE- 250/636: Performed by: INTERNAL MEDICINE

## 2017-09-08 PROCEDURE — 74011250637 HC RX REV CODE- 250/637: Performed by: INTERNAL MEDICINE

## 2017-09-08 PROCEDURE — 83735 ASSAY OF MAGNESIUM: CPT | Performed by: INTERNAL MEDICINE

## 2017-09-08 PROCEDURE — 65610000006 HC RM INTENSIVE CARE

## 2017-09-08 PROCEDURE — 80048 BASIC METABOLIC PNL TOTAL CA: CPT | Performed by: INTERNAL MEDICINE

## 2017-09-08 RX ORDER — PANTOPRAZOLE SODIUM 40 MG/1
40 TABLET, DELAYED RELEASE ORAL
Status: DISCONTINUED | OUTPATIENT
Start: 2017-09-08 | End: 2017-09-09 | Stop reason: HOSPADM

## 2017-09-08 RX ADMIN — TICAGRELOR 90 MG: 90 TABLET ORAL at 21:55

## 2017-09-08 RX ADMIN — PANTOPRAZOLE SODIUM 40 MG: 40 TABLET, DELAYED RELEASE ORAL at 09:56

## 2017-09-08 RX ADMIN — METOPROLOL TARTRATE 12.5 MG: 25 TABLET ORAL at 09:57

## 2017-09-08 RX ADMIN — TICAGRELOR 90 MG: 90 TABLET ORAL at 09:56

## 2017-09-08 RX ADMIN — LOSARTAN POTASSIUM 25 MG: 50 TABLET ORAL at 09:55

## 2017-09-08 RX ADMIN — EZETIMIBE 10 MG: 10 TABLET ORAL at 09:57

## 2017-09-08 RX ADMIN — Medication 10 ML: at 14:00

## 2017-09-08 RX ADMIN — ENOXAPARIN SODIUM 40 MG: 40 INJECTION SUBCUTANEOUS at 09:57

## 2017-09-08 RX ADMIN — Medication 10 ML: at 06:00

## 2017-09-08 RX ADMIN — METOPROLOL TARTRATE 12.5 MG: 25 TABLET ORAL at 21:55

## 2017-09-08 RX ADMIN — ASPIRIN 81 MG: 81 TABLET, COATED ORAL at 09:56

## 2017-09-08 NOTE — PROGRESS NOTES
0730: Report received, all questions answered. Assessment completed, see flow sheet. Pt A&Ox4 with no c/o pain or discomfort. Sitting up in bed watching tv at this time. Voices no needs or concerns. 6136: am meds given as ordered. Fresh ice water provided per pt request.   1300: Pt resting comfortably. Family at bedside. Voices no needs or concerns. Assessment unchanged. 1630: Pt ambulated in hallway per order. Tolerated well, HR and sats maintained WNL. Now sitting up in chair eating dinner. Orders received to transfer to [facility] when bed available.

## 2017-09-08 NOTE — PROGRESS NOTES
Problem: AMI: Day 2  Goal: Activity/Safety  Outcome: Progressing Towards Goal  .  Goal: Consults, if ordered  Outcome: Progressing Towards Goal  .  Goal: Diagnostic Test/Procedures  Outcome: Progressing Towards Goal  .

## 2017-09-08 NOTE — ROUTINE PROCESS
Cardiac Cath Lab:  Pre Procedure Chart Check     Patients chart was accessed and reviewed for possible and/or scheduled procedure. Creatinine Clearance:  CREATININE: 0.92 MG/DL (09/07/17 1315)  Estimated creatinine clearance: 90.2 mL/min    Total Contrast  Load:  3 x estimated clearance amount=  270.6   ml    75% of Contrast Load:  0.75 x Total Contrast Load= 202.9       ml    Recent Labs      09/07/17   1315   09/06/17   0930  09/06/17   0039   WBC   --    --   7.5  7.3   RBC   --    --   4.22*  4.42*   HCT   --    --   41.9  43.3   HGB   --    --   14.2  15.0   PLT   --    --   188  223   APTT   --    --    --   29.5   NA  138   --   137  140   K  4.3   --   3.7  4.2   BUN  15   --   11  10   CREA  0.92   --   0.95  1.08   GFRAA  >60   --   >60  >60   GFRNA  >60   --   >60  >60   CA  9.0   --   8.7  8.7   CPK  99   < >  179  135   CKMB  2.8   < >  14.6*  2.2   CKND1  2.8   < >  8.2*  1.6   TROIQ  1.42*   < >  4.49*  0.29*    < > = values in this interval not displayed. BMI: Body mass index is 23.87 kg/(m^2).     ALLERGIES:   Allergies   Allergen Reactions    Other Plant, Animal, Environmental Anaphylaxis     [place]    Shellfish Derived Anaphylaxis    Statins-Hmg-Coa Reductase Inhibitors Myalgia and Other (comments)     Abnormal LFTs       Lines:        Peripheral IV 09/06/17 Right Forearm (Active)   Site Assessment Clean, dry, & intact 9/8/2017  4:15 AM   Phlebitis Assessment 0 9/8/2017  4:15 AM   Infiltration Assessment 0 9/8/2017  4:15 AM   Dressing Status Clean, dry, & intact 9/8/2017  4:15 AM   Dressing Type Tape;Transparent 9/8/2017  4:15 AM   Hub Color/Line Status Green;Capped 9/8/2017  4:15 AM   Action Taken Open ports on tubing capped 9/8/2017 12:00 AM   Alcohol Cap Used Yes 9/8/2017  4:15 AM          History:    Past Medical History:   Diagnosis Date    MI (myocardial infarction) ([facility] [address]      Past Surgical History:   Procedure Laterality Date    HX CORONARY STENT PLACEMENT  2015 Patient Active Problem List   Diagnosis Code    ST elevation (STEMI) myocardial infarction involving right coronary artery (HCC) I21.11    Alcohol use Z78.9    Smoker F17.200

## 2017-09-08 NOTE — ROUTINE PROCESS
Bedside and Verbal shift change report given to ARNAV Can (oncoming nurse)  Report included the following information SBAR, Kardex, Intake/Output, MAR and Recent Results. Alarm parameters reviewed and opportunities for questions provided.

## 2017-09-08 NOTE — PROGRESS NOTES
Bedside and Verbal shift change report received from MADI Lancaster (offgoing nurse). Report included the following information SBAR, Kardex, Intake/Output, MAR and Recent Results. Alarm parameters reviewed and opportunities for questions provided.     1940 Shift assessment completed, see EMR    0000 Reassessment completed, see EMR    0415 Reassessment completed, see EMR

## 2017-09-08 NOTE — PROGRESS NOTES
Chart reviewed and attended IDR's patient being consulted by cardiology, when discharged pt plans to return back home with wife and possibly following up with outpatient cardiac rehab which cardiology arranges, at this time no cm needs determined,unit cm will be available or if d/c over weekend on call cm available for immediate needs. Care Management Interventions  PCP Verified by CM: Yes  Palliative Care Consult (Criteria: CHF and RRAT>21): No  Reason for No Palliative Care Consult: Other (see comment)  Mode of Transport at Discharge: Self (wife)  Transition of Care Consult (CM Consult):  Other  Discharge Durable Medical Equipment: No  Health Maintenance Reviewed: Yes  Physical Therapy Consult: No  Occupational Therapy Consult: No  Speech Therapy Consult: No  Current Support Network: Lives with Spouse  Confirm Follow Up Transport: Self  Plan discussed with Pt/Family/Caregiver: Yes  Freedom of Choice Offered:  (n/a)  Discharge Location  Discharge Placement: Home

## 2017-09-08 NOTE — PROGRESS NOTES
Cardiology Progress Note        Patient: Velasquez Martel        Sex: male          DOA: 9/6/2017  YOB: 1955      Age:  60 y.o.        LOS:  LOS: 2 days    Patient seen and examined. Chart reviewed  Assessment/Plan     Patient Active Problem List   Diagnosis Code    ST elevation (STEMI) myocardial infarction involving right coronary artery (HCC) I21.11    Alcohol use Z78.9    Smoker F17.200      Acute systolic heart failure NYHA class II Compensated   GERD    Denies any further episode of chest pain   Plan:    Continue aspirin, brilinta, beta blocker, ARB and zetia  Continue PPI  Continue DVT prophylaxis   Out of bed to chair and ambulate as tolerates   Transfer him to telemetry floor  Discussed plan with patient and family at bed side                Subjective:    cc:  Denies any further episode of chest pain. He mentioned that he is having mid sternal chest pain for past 3 years and Protonix helping him. His pain during MI and GERD pain is almost similar and he had extensive out patient work up done before. REVIEW OF SYSTEMS:     General: No fevers or chills. Cardiovascular: No chest pain,No palpitations, No orthopnea, No PND, No leg swelling, No claudication  Pulmonary: No shortness of breath. Gastrointestinal: No nausea, vomiting, bleeding  Neurology: No Dizziness    Objective:      Visit Vitals    /64    Pulse 63    Temp 98 °F (36.7 °C)    Resp 13    Ht 6' (1.829 m)    Wt 79.8 kg (176 lb)    SpO2 99%    BMI 23.87 kg/m2     Body mass index is 23.87 kg/(m^2). Physical Exam:  General Appearance: Comfortable, not using accessory muscles of respiration. HEENT: ASHISH. HEAD: Atraumatic  NECK: No JVD, no thyroidomeglay. CAROTIDS: No bruit  LUNGS: Clear bilaterally. HEART: S1+S2 audible, no murmur, no pericardial rub. ABD: Non-tender, BS Audible    EXT: No edema, and no cyanosis. VASCULAR EXAM: Pulses are intact.  Left wrist: no swelling, no hematoma, no ecchymosis, no tenderness, left radial pulse +,normal sensory exam of left hand     PSYCHIATRIC EXAM: Mood is appropriate. MUSCULOSKELETAL: Grossly no joint deformity.   NEUROLOGICAL: AAO times 3, No motor and sensory deficit  Medication:  Current Facility-Administered Medications   Medication Dose Route Frequency    losartan (COZAAR) tablet 25 mg  25 mg Oral DAILY    ezetimibe (ZETIA) tablet 10 mg  10 mg Oral DAILY    GI COCKTAIL [facility] CMPD)  30 mL Oral Q6H PRN    nitroglycerin (NITROSTAT) tablet 0.4 mg  0.4 mg SubLINGual PRN    sodium chloride (NS) flush 5-10 mL  5-10 mL IntraVENous Q8H    sodium chloride (NS) flush 5-10 mL  5-10 mL IntraVENous PRN    aspirin delayed-release tablet 81 mg  81 mg Oral DAILY    metoprolol tartrate (LOPRESSOR) tablet 12.5 mg  12.5 mg Oral Q12H    ticagrelor (BRILINTA) tablet 90 mg  90 mg Oral BID    enoxaparin (LOVENOX) injection 40 mg  40 mg SubCUTAneous Q24H               Lab/Data Reviewed:       Recent Labs      09/06/17   0930  09/06/17   0039   WBC  7.5  7.3   HGB  14.2  15.0   HCT  41.9  43.3   PLT  188  223     Recent Labs      09/07/17   1315  09/06/17   0930  09/06/17   0039   NA  138  137  140   K  4.3  3.7  4.2   CL  102  103  103   CO2  26  25  27   GLU  90  105*  113*   BUN  15  11  10   CREA  0.92  0.95  1.08   CA  9.0  8.7  8.7       Signed By: Kentrell Hatfield MD     September 8, 2017

## 2017-09-08 NOTE — PROGRESS NOTES
Problem: Falls - Risk of  Goal: *Absence of Falls  Document Manuel Fall Risk and appropriate interventions in the flowsheet.    Outcome: Progressing Towards Goal  Fall Risk Interventions:              Medication Interventions: Patient to call before getting OOB     Elimination Interventions: Patient to call for help with toileting needs     History of Falls Interventions: Consult care management for discharge planning

## 2017-09-09 VITALS
TEMPERATURE: 97.8 F | HEART RATE: 60 BPM | HEIGHT: 72 IN | RESPIRATION RATE: 13 BRPM | DIASTOLIC BLOOD PRESSURE: 78 MMHG | SYSTOLIC BLOOD PRESSURE: 117 MMHG | BODY MASS INDEX: 23.84 KG/M2 | OXYGEN SATURATION: 100 % | WEIGHT: 176 LBS

## 2017-09-09 PROCEDURE — 74011250637 HC RX REV CODE- 250/637: Performed by: INTERNAL MEDICINE

## 2017-09-09 PROCEDURE — 77010033678 HC OXYGEN DAILY

## 2017-09-09 PROCEDURE — 74011250636 HC RX REV CODE- 250/636: Performed by: INTERNAL MEDICINE

## 2017-09-09 RX ORDER — LOSARTAN POTASSIUM 25 MG/1
25 TABLET ORAL DAILY
Qty: 30 TAB | Refills: 1 | Status: SHIPPED | OUTPATIENT
Start: 2017-09-09

## 2017-09-09 RX ORDER — NITROGLYCERIN 0.4 MG/1
0.4 TABLET SUBLINGUAL AS NEEDED
Qty: 1 BOTTLE | Refills: 0 | Status: SHIPPED | OUTPATIENT
Start: 2017-09-09

## 2017-09-09 RX ORDER — METOPROLOL TARTRATE 25 MG/1
12.5 TABLET, FILM COATED ORAL EVERY 12 HOURS
Qty: 30 TAB | Refills: 1 | Status: SHIPPED | OUTPATIENT
Start: 2017-09-09

## 2017-09-09 RX ORDER — ASPIRIN 81 MG/1
81 TABLET ORAL DAILY
Qty: 30 TAB | Refills: 0 | Status: SHIPPED
Start: 2017-09-09

## 2017-09-09 RX ADMIN — LOSARTAN POTASSIUM 25 MG: 50 TABLET ORAL at 08:11

## 2017-09-09 RX ADMIN — PANTOPRAZOLE SODIUM 40 MG: 40 TABLET, DELAYED RELEASE ORAL at 06:48

## 2017-09-09 RX ADMIN — ASPIRIN 81 MG: 81 TABLET, COATED ORAL at 08:11

## 2017-09-09 RX ADMIN — EZETIMIBE 10 MG: 10 TABLET ORAL at 08:11

## 2017-09-09 RX ADMIN — ENOXAPARIN SODIUM 40 MG: 40 INJECTION SUBCUTANEOUS at 08:11

## 2017-09-09 RX ADMIN — Medication 10 ML: at 06:00

## 2017-09-09 RX ADMIN — METOPROLOL TARTRATE 12.5 MG: 25 TABLET ORAL at 08:11

## 2017-09-09 RX ADMIN — TICAGRELOR 90 MG: 90 TABLET ORAL at 08:11

## 2017-09-09 NOTE — ROUTINE PROCESS
Bedside, Verbal and Written shift change report given to MADI Garcia (oncoming nurse) by JOSE Sanchez RN (offgoing nurse). Report included the following information SBAR, Kardex, Intake/Output and Recent Results. Assumed care of pt at this time. Pt aox4, following commands, denies pain at this time. Pt sitting up in bedside chair. VSS  0900  Scheduled med's given. Pt tolerated well. Remains sitting in bedside chair, appears asymptomatic. 1100  Dr. Bailey Mak at bedside giving discharge instructions to pt.  1200  Discharge instruction given and reviewed with pt. Pt family at bedside. Questions answered. Pt transported to vehicle via wheel chair.

## 2017-09-09 NOTE — ROUTINE PROCESS
Bedside and Verbal shift change report given to MADI Colbert (oncoming nurse)  Report included the following information SBAR, Kardex, Intake/Output, MAR and Recent Results. Alarm parameters reviewed and opportunities for questions provided.

## 2017-09-09 NOTE — DISCHARGE INSTRUCTIONS
Taking Aspirin to Prevent Heart Attack and Stroke: Care Instructions  Your Care Instructions  Aspirin acts as a \"blood thinner. \" It prevents blood clots from forming. When taken during and after a heart attack, it can reduce your chance of dying. And it's used if you have a stent in your coronary artery. Also, aspirin helps certain people lower their risk of a heart attack or stroke. Be sure you know what dose of aspirin to take and how often to take it. Low-dose aspirin is typically 81 mg. But the dose for daily aspirin can range from 81 mg to 325 mg. Taking aspirin every day can cause bleeding. It may not be safe if you have stomach ulcers. And it may not be safe if you have high blood pressure that is not controlled. If you take aspirin pills every day, do not take ones that have other ingredients such as caffeine or sodium. Before you start to take aspirin, tell your doctor all the medicines, vitamins, herbal products, and supplements you take. Follow-up care is a key part of your treatment and safety. Be sure to make and go to all appointments, and call your doctor if you are having problems. It's also a good idea to know your test results and keep a list of the medicines you take. How can you care for yourself at home? · Take aspirin with a full glass of water unless your doctor tells you not to. Do not lie down right after you take it. · If you have a stent in your coronary artery, take your aspirin as your heart doctor says to. If another doctor says to stop taking the aspirin for any reason, talk to your heart doctor before you stop. · Do not chew or crush the coated or sustained-release forms of aspirin. · Ask your doctor if you can drink alcohol while you take aspirin. And ask how much you can drink. Too much alcohol with aspirin can cause stomach bleeding. · Do not take aspirin if you are pregnant, unless your doctor says it is okay. · Keep all aspirin out of children's reach.   · Throw aspirin away if it starts to smell like vinegar. · Do not take aspirin if you have gout or if you take prescription blood thinners, unless your doctor has told you to. · Do not take prescription or over-the-counter medicines, vitamins, herbal products, or supplements without talking to your doctor first. Everardo Lease the label before you take another over-the-counter medicine. Many contain aspirin. So they could cause you to take too much aspirin. · Talk with your doctor before you take a pain medicine. Ask which type of medicine you can take and how to take it safely with aspirin. · Tell your doctor or dentist before a surgery or procedure that you take aspirin. He or she will tell you if you should stop taking aspirin before your surgery or procedure. Make sure that you understand exactly what your doctor wants you to do. Where can you learn more? Go to http://jeffDatamolinonkechi.info/. Enter T311 in the search box to learn more about \"Taking Aspirin to Prevent Heart Attack and Stroke: Care Instructions. \"  Current as of: April 3, 2017  Content Version: 11.3  © 2881-5488 [employer]. Care instructions adapted under license by [employer] (which disclaims liability or warranty for this information). If you have questions about a medical condition or this instruction, always ask your healthcare professional. [place] [address] any warranty or liability for your use of this information. Learning About Screening for Heart Attack and Stroke Risk  What is screening for heart attack and stroke risk? Screening for heart attack and stroke risk is a way for your doctor to check your chance of having a problem called atherosclerosis. This problem is also called hardening of the arteries. It is the starting point for most heart and blood flow problems, such as heart disease, stroke, and peripheral arterial disease.   You and your doctor can use your risk score to decide if you want to take steps to lower your risk. How can you find out your risk? Your doctor looks at things that put you at risk for a heart attack and stroke. He or she might look at many things, such as:  · Your cholesterol levels. · Your blood pressure. · Your age. · Your race. · Whether you are male or female. · Whether or not you smoke. Your doctor might use a tool to calculate a risk score for you. There are different tools that doctors use. They may show that your risk is higher or lower than it really is. But the tools give you and your doctor a good idea about your risk. What happens after screening? Knowing your risk can help you and your doctor talk about whether to take steps to lower your risk. A heart-healthy lifestyle is important for everyone. Some people also take medicine to lower their risk. You and your doctor can work together to decide what is best for you. Where can you learn more? Go to http://jeffFingonkechi.info/. Enter G299 in the search box to learn more about \"Learning About Screening for Heart Attack and Stroke Risk. \"  Current as of: October 26, 2016  Content Version: 11.3  © 3565-2511 [employer]. Care instructions adapted under license by [employer] (which disclaims liability or warranty for this information). If you have questions about a medical condition or this instruction, always ask your healthcare professional. [place] [address] any warranty or liability for your use of this information. Ready to quit: Not Answered  Counseling given: Not Answered    Patient armband removed and shredded    .   DISCHARGE SUMMARY from Nurse    The following personal items are in your possession at time of discharge:    Dental Appliances: Uppers, Lowers, Partials, At home  Visual Aid: Glasses, With patient     Home Medications: None     Clothing: None  Other Valuables: Cell Phone, With patient  Personal Items Sent to Safe: none          PATIENT INSTRUCTIONS:    After general anesthesia or intravenous sedation, for 24 hours or while taking prescription Narcotics:  · Limit your activities  · Do not drive and operate hazardous machinery  · Do not make important personal or business decisions  · Do  not drink alcoholic beverages  · If you have not urinated within 8 hours after discharge, please contact your surgeon on call. Report the following to your surgeon:  · Excessive pain, swelling, redness or odor of or around the surgical area  · Temperature over 100.5  · Nausea and vomiting lasting longer than 4 hours or if unable to take medications  · Any signs of decreased circulation or nerve impairment to extremity: change in color, persistent  numbness, tingling, coldness or increase pain  · Any questions        What to do at Home:  Recommended activity: Activity as tolerated and No heavy lifting for 1 week,     If you experience any of the following symptoms swelling, chest pain, fever, please follow up with 911. *  Please give a list of your current medications to your Primary Care Provider. *  Please update this list whenever your medications are discontinued, doses are      changed, or new medications (including over-the-counter products) are added. *  Please carry medication information at all times in case of emergency situations. These are general instructions for a healthy lifestyle:    No smoking/ No tobacco products/ Avoid exposure to second hand smoke    Surgeon General's Warning:  Quitting smoking now greatly reduces serious risk to your health.     Obesity, smoking, and sedentary lifestyle greatly increases your risk for illness    A healthy diet, regular physical exercise & weight monitoring are important for maintaining a healthy lifestyle    You may be retaining fluid if you have a history of heart failure or if you experience any of the following symptoms:  Weight gain of 3 pounds or more overnight or 5 pounds in a week, increased swelling in our hands or feet or shortness of breath while lying flat in bed. Please call your doctor as soon as you notice any of these symptoms; do not wait until your next office visit. Recognize signs and symptoms of STROKE:    F-face looks uneven    A-arms unable to move or move unevenly    S-speech slurred or non-existent    T-time-call 911 as soon as signs and symptoms begin-DO NOT go       Back to bed or wait to see if you get better-TIME IS BRAIN. Warning Signs of HEART ATTACK     Call 911 if you have these symptoms:   Chest discomfort. Most heart attacks involve discomfort in the center of the chest that lasts more than a few minutes, or that goes away and comes back. It can feel like uncomfortable pressure, squeezing, fullness, or pain.  Discomfort in other areas of the upper body. Symptoms can include pain or discomfort in one or both arms, the back, neck, jaw, or stomach.  Shortness of breath with or without chest discomfort.  Other signs may include breaking out in a cold sweat, nausea, or lightheadedness. Don't wait more than five minutes to call 911 - MINUTES MATTER! Fast action can save your life. Calling 911 is almost always the fastest way to get lifesaving treatment. Emergency Medical Services staff can begin treatment when they arrive -- up to an hour sooner than if someone gets to the hospital by car. The discharge information has been reviewed with the patient. The patient verbalized understanding. Discharge medications reviewed with the patient and appropriate educational materials and side effects teaching were provided.     Reviewed discharge medication with Bianka Tucker RN

## 2017-09-09 NOTE — PROGRESS NOTES
Bedside and Verbal shift change report received from CODY Cardoza RN (offgoing nurse). Report included the following information SBAR, Kardex, Intake/Output, MAR and Recent Results. Alarm parameters reviewed and opportunities for questions provided.     2015 Shift assessment completed, see EMR    0030 Reassessment completed, see EMR    0400 Reassessment completed, see EMR

## 2017-09-09 NOTE — DISCHARGE SUMMARY
Discharge Summary    Patient: Ivett Solis MRN: [id number]  CSN: [id number]    YOB: 1955  Age: 60 y.o. Sex: male    DOA: 9/6/2017 LOS:  LOS: 3 days   Discharge Date:      Primary Care Provider:  Sabas Baltazar DO    Admission Diagnoses: ST elevation (STEMI) myocardial infarction involving right coronary artery [facility])    Discharge Diagnoses:  STEMI inferior wall s/p PCI Distal RCA DOMINIC   Problem List as of 9/9/2017  Date Reviewed: 9/9/2017          Codes Class Noted - Resolved    ST elevation (STEMI) myocardial infarction involving right coronary artery ([facility] [address] ICD-10-CM: I21.11  ICD-9-CM: 410.31  9/6/2017 - Present        Alcohol use ICD-10-CM: Z78.9  ICD-9-CM: V49.89  9/7/2017 - Present        Smoker ICD-10-CM: F17.200  ICD-9-CM: 305.1  9/7/2017 - Present              Discharge Medications:     Current Discharge Medication List      START taking these medications    Details   aspirin delayed-release 81 mg tablet Take 1 Tab by mouth daily. Qty: 30 Tab, Refills: 0      losartan (COZAAR) 25 mg tablet Take 1 Tab by mouth daily. Qty: 30 Tab, Refills: 1      metoprolol tartrate (LOPRESSOR) 25 mg tablet Take 0.5 Tabs by mouth every twelve (12) hours. Qty: 30 Tab, Refills: 1      !! nitroglycerin (NITROSTAT) 0.4 mg SL tablet 1 Tab by SubLINGual route as needed for Chest Pain. Qty: 1 Bottle, Refills: 0      ticagrelor (BRILINTA) 90 mg tablet Take 1 Tab by mouth two (2) times a day. Qty: 60 Tab, Refills: 1       !! - Potential duplicate medications found. Please discuss with provider. CONTINUE these medications which have NOT CHANGED    Details   pantoprazole (PROTONIX) 20 mg tablet Take 20 mg by mouth two (2) times a day. !! nitroglycerin (NITROSTAT) 0.4 mg SL tablet 0.4 mg by SubLINGual route every five (5) minutes as needed for Chest Pain.      ezetimibe (ZETIA) 10 mg tablet Take 10 mg by mouth daily. !! - Potential duplicate medications found. Please discuss with provider. Discharge Condition: Stable     Procedures : LHC and coronary angiogram + PCI    Consults: Hospital medicine       PHYSICAL EXAM   Visit Vitals    /68    Pulse 61    Temp 97.5 °F (36.4 °C)    Resp 25    Ht 6' (1.829 m)    Wt 79.8 kg (176 lb)    SpO2 100%    BMI 23.87 kg/m2     General: Awake, cooperative, no acute distress    HEENT: NC, Atraumatic. PERRLA, EOMI. Anicteric sclerae. Lungs:  CTA Bilaterally. No Wheezing/Rhonchi/Rales. Heart:  Regular  rhythm,  No murmur, No Rubs, No Gallops  Abdomen: Soft, Non distended, Non tender. +Bowel sounds,   Extremities: No c/c/e. Left wrist: no swelling, no hematoma, no tenderness, no ecchymosis, normal left radial pulse, normal sensory exam of Left hand   Psych:   Not anxious or agitated. Neurologic:  No acute neurological deficits. Admission HPI : Jan Chauhan is a 60 y.o. male with known history of coronary artery disease and PCI in 2015 came for worsening of chest pain since 4 p.m. yesterday. He developed substernal pressure-like chest pain around 4 p.m. Yesterday evening, he took sublingual nitroglycerin with partial relief. Initially pain was 10 out of 10 but it reduced to 6 out of 10 and he tried to take some rest but pain did not relieved. Prior to emergency room arrival his chest pain got worse and he called 911. Chest pain was associated with shortness of breath. He had similar chest pain when he had MI in 2015. At present he has 8 out of 10 chest pain. He is smoking for long time. He is not on any statin medication due to intolerance. He tried multiple statin in  But could not tolerate it. His primary cardiologist started cholesterol medication but does not remember the name. He was brought to cath lab found to have instent thrombosis of distal RCA stent and underwent successful PCI of distal RCA with DOMINIC.      Hospital Course : He had few episode of chest pain after PCI which improved after GI cocktail and Protonix. No chest pain on exertion during hospital stay. Echocardiogram revealed wall motion abnormality with LVEF 45%. He was started on beta blocker, losartan and brilinta. Aspirin, Zetia and Protonix were continued. Patient remained hemodynamically stable and asymptomatic for last 36 hours. Patient was given one month supply of brailinta on discharge with prescriptions. Strongly advised to quit smoking. Offered cardiac rehabitation but he will discuss with his primary cardiologist.     Activity: No strenuous exercise or activity for 6 weeks. No weight lifting not more than 5 lbs from left hand for 2 days. No work for now. Diet: Cardiac     Follow-up: Follow up with PCI in 1 week and with his primary cardiologist Thais Wu in 2 weeks     Disposition: Home     Minutes spent on discharge: 40 minutes       Labs: Results:       Chemistry Recent Labs      09/08/17   1355  09/07/17   1315   GLU  86  90   NA  137  138   K  3.8  4.3   CL  99*  102   CO2  29  26   BUN  17  15   CREA  1.02  0.92   CA  9.6  9.0   AGAP  9  10   BUCR  17  16   AP   --   72   TP   --   7.1   ALB   --   3.3*   GLOB   --   3.8   AGRAT   --   0.9      CBC w/Diff No results for input(s): WBC, RBC, HGB, HCT, PLT, GRANS, LYMPH, EOS, HGBEXT, HCTEXT, PLTEXT in the last 72 hours. Cardiac Enzymes Recent Labs      09/07/17   1315  09/06/17   1323   CPK  99  201   CKND1  2.8  6.9*      Coagulation No results for input(s): PTP, INR, APTT in the last 72 hours. No lab exists for component: INREXT    Lipid Panel Lab Results   Component Value Date/Time    Cholesterol, total 172 09/07/2017 01:15 PM    HDL Cholesterol 53 09/07/2017 01:15 PM    LDL, calculated 85.4 09/07/2017 01:15 PM    VLDL, calculated 33.6 09/07/2017 01:15 PM    Triglyceride 168 09/07/2017 01:15 PM    CHOL/HDL Ratio 3.2 09/07/2017 01:15 PM      BNP No results for input(s): BNPP in the last 72 hours.    Liver Enzymes Recent Labs      09/07/17   1315   TP  7.1   ALB 3.3*   AP  72   SGOT  29      Thyroid Studies No results found for: T4, T3U, TSH, TSHEXT         Significant Diagnostic Studies: Xr Chest Port    Result Date: 9/6/2017  --------------------------------------------------------------------------- <<<<<<<<<           [facility]           >>>>>>>>> --------------------------------------------------------------------------- CLINICAL HISTORY:  Chest pain. COMPARISON EXAMINATIONS:  None. ---  SINGLE FRONTAL VIEW OF THE CHEST  --- The lungs and pleural spaces are clear. The mediastinum is unremarkable in appearance. No significant osseous abnormalities are identified.  --------------    Impression: -------------- No active pulmonary disease. No results found for this or any previous visit.         CC: Graeme Baltazar, DO

## 2017-09-10 LAB
ATRIAL RATE: 56 BPM
ATRIAL RATE: 59 BPM
CALCULATED P AXIS, ECG09: 58 DEGREES
CALCULATED P AXIS, ECG09: 62 DEGREES
CALCULATED R AXIS, ECG10: 43 DEGREES
CALCULATED R AXIS, ECG10: 8 DEGREES
CALCULATED T AXIS, ECG11: -141 DEGREES
CALCULATED T AXIS, ECG11: 53 DEGREES
DIAGNOSIS, 93000: NORMAL
DIAGNOSIS, 93000: NORMAL
P-R INTERVAL, ECG05: 192 MS
P-R INTERVAL, ECG05: 230 MS
Q-T INTERVAL, ECG07: 440 MS
Q-T INTERVAL, ECG07: 460 MS
QRS DURATION, ECG06: 140 MS
QRS DURATION, ECG06: 94 MS
QTC CALCULATION (BEZET), ECG08: 435 MS
QTC CALCULATION (BEZET), ECG08: 443 MS
VENTRICULAR RATE, ECG03: 56 BPM
VENTRICULAR RATE, ECG03: 59 BPM